# Patient Record
Sex: MALE | Race: WHITE | NOT HISPANIC OR LATINO
[De-identification: names, ages, dates, MRNs, and addresses within clinical notes are randomized per-mention and may not be internally consistent; named-entity substitution may affect disease eponyms.]

---

## 2017-03-30 PROBLEM — Z00.00 ENCOUNTER FOR PREVENTIVE HEALTH EXAMINATION: Status: ACTIVE | Noted: 2017-03-30

## 2018-06-27 ENCOUNTER — RX RENEWAL (OUTPATIENT)
Age: 83
End: 2018-06-27

## 2018-06-28 ENCOUNTER — RX RENEWAL (OUTPATIENT)
Age: 83
End: 2018-06-28

## 2018-06-29 ENCOUNTER — APPOINTMENT (OUTPATIENT)
Dept: INTERNAL MEDICINE | Facility: CLINIC | Age: 83
End: 2018-06-29

## 2018-06-29 ENCOUNTER — APPOINTMENT (OUTPATIENT)
Dept: CARDIOLOGY | Facility: CLINIC | Age: 83
End: 2018-06-29

## 2018-06-29 VITALS
DIASTOLIC BLOOD PRESSURE: 74 MMHG | HEART RATE: 74 BPM | TEMPERATURE: 97.7 F | WEIGHT: 132 LBS | BODY MASS INDEX: 20.96 KG/M2 | HEIGHT: 66.5 IN | OXYGEN SATURATION: 98 % | SYSTOLIC BLOOD PRESSURE: 161 MMHG

## 2018-06-29 VITALS
DIASTOLIC BLOOD PRESSURE: 80 MMHG | OXYGEN SATURATION: 97 % | HEART RATE: 71 BPM | TEMPERATURE: 97.9 F | SYSTOLIC BLOOD PRESSURE: 162 MMHG

## 2018-06-29 DIAGNOSIS — Z86.39 PERSONAL HISTORY OF OTHER ENDOCRINE, NUTRITIONAL AND METABOLIC DISEASE: ICD-10-CM

## 2018-06-29 DIAGNOSIS — Z86.69 PERSONAL HISTORY OF OTHER DISEASES OF THE NERVOUS SYSTEM AND SENSE ORGANS: ICD-10-CM

## 2018-06-29 DIAGNOSIS — Z87.891 PERSONAL HISTORY OF NICOTINE DEPENDENCE: ICD-10-CM

## 2018-06-29 DIAGNOSIS — Z85.46 PERSONAL HISTORY OF MALIGNANT NEOPLASM OF PROSTATE: ICD-10-CM

## 2018-06-29 DIAGNOSIS — Z86.79 PERSONAL HISTORY OF OTHER DISEASES OF THE CIRCULATORY SYSTEM: ICD-10-CM

## 2018-06-29 RX ORDER — CHROMIUM 200 MCG
1000 TABLET ORAL
Refills: 0 | Status: ACTIVE | COMMUNITY

## 2018-06-29 RX ORDER — PSYLLIUM HUSK 0.4 G
CAPSULE ORAL
Refills: 0 | Status: ACTIVE | COMMUNITY

## 2018-06-29 RX ORDER — ASPIRIN 81 MG
81 TABLET, DELAYED RELEASE (ENTERIC COATED) ORAL
Refills: 0 | Status: ACTIVE | COMMUNITY

## 2018-06-29 RX ORDER — UBIDECARENONE/VIT E ACET 100MG-5
CAPSULE ORAL
Refills: 0 | Status: ACTIVE | COMMUNITY

## 2018-06-29 RX ORDER — MULTIVITAMIN
TABLET ORAL
Refills: 0 | Status: ACTIVE | COMMUNITY

## 2018-06-29 NOTE — ASSESSMENT
[FreeTextEntry1] : pt doing well after pelvic fracture. Will check routine labs andsee pt in 3 mos.

## 2018-06-29 NOTE — HISTORY OF PRESENT ILLNESS
[FreeTextEntry1] : follow up recent hospitalization for pelvic fx [de-identified] : pt with a pelvic fx in march. got out of rehab x 1 mos. doing well without pain, sob or palpitations. wt is  down to 130. meds reviewed. pt off amlodipine and tramadol. on toprol.

## 2018-07-02 LAB
ALBUMIN SERPL ELPH-MCNC: 4.1 G/DL
ALP BLD-CCNC: 85 U/L
ALT SERPL-CCNC: 10 U/L
ANION GAP SERPL CALC-SCNC: 14 MMOL/L
AST SERPL-CCNC: 16 U/L
BILIRUB SERPL-MCNC: 0.3 MG/DL
BUN SERPL-MCNC: 19 MG/DL
CALCIUM SERPL-MCNC: 9.6 MG/DL
CHLORIDE SERPL-SCNC: 103 MMOL/L
CHOLEST SERPL-MCNC: 159 MG/DL
CHOLEST/HDLC SERPL: 2.7 RATIO
CO2 SERPL-SCNC: 24 MMOL/L
CREAT SERPL-MCNC: 0.92 MG/DL
GLUCOSE SERPL-MCNC: 90 MG/DL
HDLC SERPL-MCNC: 60 MG/DL
LDLC SERPL CALC-MCNC: 79 MG/DL
POTASSIUM SERPL-SCNC: 5.1 MMOL/L
PROT SERPL-MCNC: 7.2 G/DL
SODIUM SERPL-SCNC: 141 MMOL/L
TRIGL SERPL-MCNC: 101 MG/DL

## 2018-07-03 ENCOUNTER — MEDICATION RENEWAL (OUTPATIENT)
Age: 83
End: 2018-07-03

## 2018-07-05 LAB
BASOPHILS # BLD AUTO: 0.03 K/UL
BASOPHILS NFR BLD AUTO: 0.5 %
EOSINOPHIL # BLD AUTO: 0.54 K/UL
EOSINOPHIL NFR BLD AUTO: 8.8 %
HCT VFR BLD CALC: 37.1 %
HGB BLD-MCNC: 11.7 G/DL
IMM GRANULOCYTES NFR BLD AUTO: 1.1 %
LYMPHOCYTES # BLD AUTO: 0.68 K/UL
LYMPHOCYTES NFR BLD AUTO: 11 %
MAN DIFF?: NORMAL
MCHC RBC-ENTMCNC: 28.5 PG
MCHC RBC-ENTMCNC: 31.5 GM/DL
MCV RBC AUTO: 90.5 FL
MONOCYTES # BLD AUTO: 0.58 K/UL
MONOCYTES NFR BLD AUTO: 9.4 %
NEUTROPHILS # BLD AUTO: 4.26 K/UL
NEUTROPHILS NFR BLD AUTO: 69.2 %
PLATELET # BLD AUTO: 318 K/UL
RBC # BLD: 4.1 M/UL
RBC # FLD: 16 %
WBC # FLD AUTO: 6.16 K/UL

## 2018-07-22 ENCOUNTER — NON-APPOINTMENT (OUTPATIENT)
Age: 83
End: 2018-07-22

## 2018-07-22 PROBLEM — Z86.79 HISTORY OF HEART VALVE ABNORMALITY: Status: RESOLVED | Noted: 2018-07-22 | Resolved: 2018-07-22

## 2018-07-22 PROBLEM — Z86.79 HISTORY OF HYPERTENSION: Status: RESOLVED | Noted: 2018-07-22 | Resolved: 2018-07-22

## 2018-07-22 PROBLEM — Z86.39 HISTORY OF HYPERLIPIDEMIA: Status: RESOLVED | Noted: 2018-07-22 | Resolved: 2018-07-22

## 2018-07-22 PROBLEM — Z86.69 HISTORY OF GLAUCOMA: Status: RESOLVED | Noted: 2018-07-22 | Resolved: 2018-07-22

## 2018-07-22 PROBLEM — Z87.891 FORMER SMOKER: Status: ACTIVE | Noted: 2018-07-22

## 2018-10-08 ENCOUNTER — APPOINTMENT (OUTPATIENT)
Dept: INTERNAL MEDICINE | Facility: CLINIC | Age: 83
End: 2018-10-08

## 2018-10-08 ENCOUNTER — APPOINTMENT (OUTPATIENT)
Dept: CARDIOLOGY | Facility: CLINIC | Age: 83
End: 2018-10-08

## 2018-10-08 ENCOUNTER — NON-APPOINTMENT (OUTPATIENT)
Age: 83
End: 2018-10-08

## 2018-10-08 VITALS
BODY MASS INDEX: 20.92 KG/M2 | HEART RATE: 59 BPM | DIASTOLIC BLOOD PRESSURE: 91 MMHG | WEIGHT: 138 LBS | OXYGEN SATURATION: 100 % | TEMPERATURE: 97.7 F | SYSTOLIC BLOOD PRESSURE: 181 MMHG | HEIGHT: 68 IN

## 2018-10-08 DIAGNOSIS — Z80.3 FAMILY HISTORY OF MALIGNANT NEOPLASM OF BREAST: ICD-10-CM

## 2018-10-08 DIAGNOSIS — Z80.0 FAMILY HISTORY OF MALIGNANT NEOPLASM OF DIGESTIVE ORGANS: ICD-10-CM

## 2018-10-08 DIAGNOSIS — I48.0 PAROXYSMAL ATRIAL FIBRILLATION: ICD-10-CM

## 2018-10-08 DIAGNOSIS — Z87.81 PERSONAL HISTORY OF (HEALED) TRAUMATIC FRACTURE: ICD-10-CM

## 2018-10-08 NOTE — ASSESSMENT
[FreeTextEntry1] : Patient is doing quite well. He may reduce his iron pill to one daily. He is to return in 3 months for labs.

## 2018-10-08 NOTE — HISTORY OF PRESENT ILLNESS
[FreeTextEntry1] : Routine followup patient with history of rectal carcinoma and ASHD. [de-identified] : Patient has been doing quite well. He remains active and able to drive. He is taking iron supplements twice daily. Hemoglobin is up to 12.4. He denies bowel problems. Denies chest pain or shortness of breath. He denies pelvic or leg pains after his pelvic fracture a few months ago. He has recovered beautifully.

## 2018-10-10 PROBLEM — Z87.81 HISTORY OF FRACTURE OF PELVIS: Status: RESOLVED | Noted: 2018-10-10 | Resolved: 2018-10-10

## 2018-10-18 PROBLEM — Z80.0 FAMILY HISTORY OF THROAT CANCER: Status: ACTIVE | Noted: 2018-10-18

## 2018-10-18 PROBLEM — I48.0 PAROXYSMAL ATRIAL FIBRILLATION: Status: RESOLVED | Noted: 2018-06-29 | Resolved: 2018-10-18

## 2018-10-18 PROBLEM — Z80.0 FAMILY HISTORY OF LIVER CANCER: Status: ACTIVE | Noted: 2018-10-18

## 2018-10-18 PROBLEM — Z80.3 FAMILY HISTORY OF MALIGNANT NEOPLASM OF BREAST: Status: ACTIVE | Noted: 2018-10-18

## 2018-10-31 ENCOUNTER — APPOINTMENT (OUTPATIENT)
Dept: INTERNAL MEDICINE | Facility: CLINIC | Age: 83
End: 2018-10-31

## 2018-10-31 VITALS
BODY MASS INDEX: 20.46 KG/M2 | HEIGHT: 68 IN | OXYGEN SATURATION: 97 % | SYSTOLIC BLOOD PRESSURE: 184 MMHG | DIASTOLIC BLOOD PRESSURE: 77 MMHG | WEIGHT: 135 LBS | HEART RATE: 76 BPM

## 2018-10-31 NOTE — PHYSICAL EXAM

## 2018-10-31 NOTE — ASSESSMENT
[FreeTextEntry1] : I suspect the cough is due a viral upper respiratory tract infection. I doubt we are dealing with pneumonia. I doubt we are dealing with an aspiration process. Patient is to observe the cough can call me next week.

## 2018-10-31 NOTE — HISTORY OF PRESENT ILLNESS
[FreeTextEntry1] : Evaluation for cough. [de-identified] : Patient with a 2-3 day history of cough mainly dry. Occasionally productive of white sputum. No fever chills or shortness of breath. He admits to rhinorrhea. There is a history of a prior swallowing problem after his stroke. He's been on thin liquids for a few months and has not had any problems. It's unclear if the cough is related to thin liquids for now. There is no sick contacts. Patient has received his flu vaccine.

## 2018-12-10 ENCOUNTER — APPOINTMENT (OUTPATIENT)
Dept: INTERNAL MEDICINE | Facility: CLINIC | Age: 83
End: 2018-12-10
Payer: MEDICARE

## 2018-12-10 ENCOUNTER — RESULT REVIEW (OUTPATIENT)
Age: 83
End: 2018-12-10

## 2018-12-10 VITALS
DIASTOLIC BLOOD PRESSURE: 80 MMHG | WEIGHT: 135 LBS | HEIGHT: 68 IN | SYSTOLIC BLOOD PRESSURE: 140 MMHG | TEMPERATURE: 97.6 F | HEART RATE: 72 BPM | OXYGEN SATURATION: 97 % | BODY MASS INDEX: 20.46 KG/M2

## 2018-12-10 DIAGNOSIS — J06.9 ACUTE UPPER RESPIRATORY INFECTION, UNSPECIFIED: ICD-10-CM

## 2018-12-10 PROCEDURE — 99214 OFFICE O/P EST MOD 30 MIN: CPT | Mod: 25

## 2018-12-10 PROCEDURE — 36415 COLL VENOUS BLD VENIPUNCTURE: CPT

## 2018-12-10 NOTE — ASSESSMENT
[FreeTextEntry1] : I suspect the cough is due a viral upper respiratory tract infection. However in view of the recurrent nature of the cough we'll get a chest x-ray. We'll also check labs in view of the pale conjunctiva on exam.

## 2018-12-10 NOTE — HISTORY OF PRESENT ILLNESS
[FreeTextEntry1] : Dry cough x2 weeks. [de-identified] : Patient with a dry cough x2 weeks. He denies fever chills or rhinorrhea. Of note the patient had a cough the end of October as well. This seemed to resolve but now has come back over the past 2 weeks. Patient denies swallowing difficulty. The family is concerned because of the recurrent cough. He generally feels well otherwise.

## 2018-12-10 NOTE — PHYSICAL EXAM
[No Acute Distress] : no acute distress [Well Nourished] : well nourished [Well Developed] : well developed [Well-Appearing] : well-appearing [PERRL] : pupils equal round and reactive to light [EOMI] : extraocular movements intact [Normal Outer Ear/Nose] : the outer ears and nose were normal in appearance [Normal Oropharynx] : the oropharynx was normal [No JVD] : no jugular venous distention [Supple] : supple [No Lymphadenopathy] : no lymphadenopathy [Thyroid Normal, No Nodules] : the thyroid was normal and there were no nodules present [No Respiratory Distress] : no respiratory distress  [Clear to Auscultation] : lungs were clear to auscultation bilaterally [No Accessory Muscle Use] : no accessory muscle use [Normal Rate] : normal rate  [Regular Rhythm] : with a regular rhythm [Normal S1, S2] : normal S1 and S2 [No Murmur] : no murmur heard [No Carotid Bruits] : no carotid bruits [No Abdominal Bruit] : a ~M bruit was not heard ~T in the abdomen [No Varicosities] : no varicosities [Pedal Pulses Present] : the pedal pulses are present [No Edema] : there was no peripheral edema [No Extremity Clubbing/Cyanosis] : no extremity clubbing/cyanosis [No Palpable Aorta] : no palpable aorta [Soft] : abdomen soft [Non Tender] : non-tender [Non-distended] : non-distended [No Masses] : no abdominal mass palpated [No HSM] : no HSM [Normal Bowel Sounds] : normal bowel sounds [Normal Posterior Cervical Nodes] : no posterior cervical lymphadenopathy [Normal Anterior Cervical Nodes] : no anterior cervical lymphadenopathy [No CVA Tenderness] : no CVA  tenderness [No Spinal Tenderness] : no spinal tenderness [No Joint Swelling] : no joint swelling [Grossly Normal Strength/Tone] : grossly normal strength/tone [No Rash] : no rash [Normal Gait] : normal gait [Coordination Grossly Intact] : coordination grossly intact [No Focal Deficits] : no focal deficits [Normal Affect] : the affect was normal [Normal Insight/Judgement] : insight and judgment were intact [de-identified] : pale conjunctiva

## 2018-12-11 LAB
ALBUMIN SERPL ELPH-MCNC: 4.2 G/DL
ALP BLD-CCNC: 73 U/L
ALT SERPL-CCNC: 11 U/L
ANION GAP SERPL CALC-SCNC: 10 MMOL/L
AST SERPL-CCNC: 16 U/L
BASOPHILS # BLD AUTO: 0.03 K/UL
BASOPHILS NFR BLD AUTO: 0.5 %
BILIRUB SERPL-MCNC: 0.2 MG/DL
BUN SERPL-MCNC: 22 MG/DL
CALCIUM SERPL-MCNC: 9.3 MG/DL
CHLORIDE SERPL-SCNC: 104 MMOL/L
CO2 SERPL-SCNC: 27 MMOL/L
CREAT SERPL-MCNC: 1 MG/DL
EOSINOPHIL # BLD AUTO: 0.61 K/UL
EOSINOPHIL NFR BLD AUTO: 10 %
GLUCOSE SERPL-MCNC: 96 MG/DL
HCT VFR BLD CALC: 39 %
HGB BLD-MCNC: 12.3 G/DL
IMM GRANULOCYTES NFR BLD AUTO: 1.3 %
LYMPHOCYTES # BLD AUTO: 0.5 K/UL
LYMPHOCYTES NFR BLD AUTO: 8.2 %
MAN DIFF?: NORMAL
MCHC RBC-ENTMCNC: 29.4 PG
MCHC RBC-ENTMCNC: 31.5 GM/DL
MCV RBC AUTO: 93.1 FL
MONOCYTES # BLD AUTO: 0.64 K/UL
MONOCYTES NFR BLD AUTO: 10.5 %
NEUTROPHILS # BLD AUTO: 4.25 K/UL
NEUTROPHILS NFR BLD AUTO: 69.5 %
PLATELET # BLD AUTO: 356 K/UL
POTASSIUM SERPL-SCNC: 4.9 MMOL/L
PROT SERPL-MCNC: 6.8 G/DL
RBC # BLD: 4.19 M/UL
RBC # FLD: 13.6 %
SODIUM SERPL-SCNC: 141 MMOL/L
WBC # FLD AUTO: 6.11 K/UL

## 2019-01-02 ENCOUNTER — RECORD ABSTRACTING (OUTPATIENT)
Age: 84
End: 2019-01-02

## 2019-01-02 DIAGNOSIS — Z78.9 OTHER SPECIFIED HEALTH STATUS: ICD-10-CM

## 2019-01-02 DIAGNOSIS — S22.39XA FRACTURE OF ONE RIB, UNSPECIFIED SIDE, INITIAL ENCOUNTER FOR CLOSED FRACTURE: ICD-10-CM

## 2019-01-02 RX ORDER — TIMOLOL MALEATE 5 MG/ML
0.5 SOLUTION OPHTHALMIC
Refills: 0 | Status: ACTIVE | COMMUNITY

## 2019-03-01 ENCOUNTER — APPOINTMENT (OUTPATIENT)
Dept: INTERNAL MEDICINE | Facility: CLINIC | Age: 84
End: 2019-03-01
Payer: MEDICARE

## 2019-03-01 VITALS
DIASTOLIC BLOOD PRESSURE: 70 MMHG | HEART RATE: 71 BPM | OXYGEN SATURATION: 99 % | BODY MASS INDEX: 20.53 KG/M2 | TEMPERATURE: 98 F | SYSTOLIC BLOOD PRESSURE: 130 MMHG | WEIGHT: 135 LBS

## 2019-03-01 PROCEDURE — 99213 OFFICE O/P EST LOW 20 MIN: CPT

## 2019-03-01 NOTE — HISTORY OF PRESENT ILLNESS
[FreeTextEntry1] : Patient for followup of hypertension, and anemia. [de-identified] : Patient is doing quite well over the past 3 months. His appetite is good. His weight is stable at about 140. His bowels are normal. He is going to his oncologist next week. He is on one iron tablet a day. He will be followed by Dr. lopez next month. He denies cough or shortness of breath. He again overall is doing quite well without chronic complaints.

## 2019-03-01 NOTE — ASSESSMENT
[FreeTextEntry1] : Patient with history of hypertension and history of CVA with complete resolution. Patient's blood pressure is under good control at 130/80. He is to continue current medications. He will be getting a blood test next week by his oncologist. Advance him to send me a copy. Patient will follow up with me in 3 months

## 2019-04-05 ENCOUNTER — APPOINTMENT (OUTPATIENT)
Dept: CARDIOLOGY | Facility: CLINIC | Age: 84
End: 2019-04-05
Payer: MEDICARE

## 2019-04-05 VITALS
DIASTOLIC BLOOD PRESSURE: 80 MMHG | WEIGHT: 140 LBS | SYSTOLIC BLOOD PRESSURE: 150 MMHG | HEART RATE: 67 BPM | OXYGEN SATURATION: 97 % | BODY MASS INDEX: 21.29 KG/M2

## 2019-04-05 PROCEDURE — 93000 ELECTROCARDIOGRAM COMPLETE: CPT

## 2019-04-05 PROCEDURE — 99214 OFFICE O/P EST MOD 30 MIN: CPT

## 2019-04-06 ENCOUNTER — NON-APPOINTMENT (OUTPATIENT)
Age: 84
End: 2019-04-06

## 2019-04-06 NOTE — PHYSICAL EXAM
[Normal Conjunctiva] : the conjunctiva exhibited no abnormalities [Auscultation Breath Sounds / Voice Sounds] : lungs were clear to auscultation bilaterally [Heart Rate And Rhythm] : heart rate and rhythm were normal [Heart Sounds] : normal S1 and S2 [Murmurs] : no murmurs present [Edema] : no peripheral edema present [Bowel Sounds] : normal bowel sounds [Abdomen Soft] : soft [Abnormal Walk] : normal gait [Cyanosis, Localized] : no localized cyanosis [] : no rash [Affect] : the affect was normal [FreeTextEntry1] : pleasant

## 2019-04-06 NOTE — HISTORY OF PRESENT ILLNESS
[FreeTextEntry1] : 88-year-old man\par Routine followup\par "I feel okay "Mr. Mckenzie denies chest pain, palpitations, shortness of breath or syncope.\par His main complaint is that "I'm getting older. "\par Dawit is accompanied today by his wife and son.

## 2019-04-06 NOTE — REVIEW OF SYSTEMS
[Feeling Fatigued] : feeling fatigued [Eyeglasses] : currently wearing eyeglasses [see HPI] : see HPI [Negative] : Psychiatric

## 2019-04-06 NOTE — DISCUSSION/SUMMARY
[FreeTextEntry1] : Atrial fibrillation\par The working diagnosis is paroxysmal atrial fibrillation secondary to atherosclerotic -hypertensive heart disease. In 4/18  atrial fibrillation with a ventricular response 80/minute was detected in the setting of a hospitalization for altered mental status and diarrhea. Sinus rhythm was restored spontaneously . A 9/18 Holter monitor was normal. In my judgment, the risk of anticoagulation/antithrombotic therapy at this time outweigh any potential benefit.\par \par I have recommended the following:\par 1. Continue the present medical regimen\par 2. No further cardiac testing for this problem at this time\par 3. Reconsideration of anticoagulation if recurrent atrial fibrillation is documented\par \par \par \par Hyperlipidemia\par Hyperlipidemia represents a risk factor for progressive atherosclerotic disease. The target LDL level with known efforts chronic heart disease is about 70. HMG-CoA reductase inhibitor therapy has been effective. In 6/18 the serum cholesterol level was 159 triglycerides 101 HDL 60 and LDL 79. The dose of atorvastatin may be increased if required to maintain optimal levels. Non-pharmacological therapy, specifically diet and exercise are emphasized as major aspects of treatment.\par \par I have recommended the following:\par 1 low-salt low-fat diet. Regular aerobic exercise\par 2 continue present medical regimen\par 3 target LDL level to about 70 as discussed above\par 4 increase atorvastatin dose of required to maintain optimal levels as noted above.\par \par \par \par Atherosclerotic heart disease\par Atherosclerotic heart disease is stable . 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 The RCA was 100% occluded. The LAD had a 20% stenosis. The left circumflex was patent. The left ventricle ejection fraction was 52%. Since that time has been no recurrent myocardial ischemic event.. The resting 4/19  electrocardiogram shows no evidence of myocardial ischemia or infarction. A 4/18 echocardiogram demonstrated left ventricular ejection fraction of 64%.. In view of the lack of symptoms and clinical course continued medical management is indicated. The patient's age and multiple medical problems are major influences on management decisions.\par \par I have recommended the following:\par 1. Risk factor modification\par 2. Continue the present medical regimen\par 3. No further cardiac testing for this problem at this time\par 4.Lexiscan sestamibi study prior to next office evaluation in 6 months\par \par \par Hypertension\par Hypertension has reportedly been controlled on the present medical regimen. Elevation of the blood pressure on initial examination today( 150 /80  ) may in part be related to a "white coat effect." Repeat blood pressure checks will be helpful to determine requirements for additional antihypertensive medical therapy. In the setting of atherosclerotic heart disease previous myocardial infarction and atrial fibrillation beta blocker and Ace-I./ARB therapy are attractive.\par \par I have recommended the following:\par 1. Continue the present medical regimen\par 2. Home blood pressure monitoring\par 3. Increased metoprolol dose and/or addition of ACE-I/ARB therapy if required to maintain optimum levels as discussed above\par \par \par Valvular heart disease\par The 4/18 echocardiogram revealed mild mitral regurgitation with calcification the mitral valve annulus. Aortic valve sclerosis was noted. The left ventricle ejection fraction was 64% . The present cardiac physical examination is not suggestive of severe mitral regurgitation. In view of the lack of symptoms, absence of heart failure and clinical course continued monitoring without intervention is indicated at this time .\par \par I have recommended the following :\par 1. no further cardiac testing for this problem at this time.

## 2019-06-07 ENCOUNTER — APPOINTMENT (OUTPATIENT)
Dept: INTERNAL MEDICINE | Facility: CLINIC | Age: 84
End: 2019-06-07
Payer: MEDICARE

## 2019-06-07 VITALS
BODY MASS INDEX: 21.52 KG/M2 | HEIGHT: 68 IN | SYSTOLIC BLOOD PRESSURE: 140 MMHG | HEART RATE: 95 BPM | WEIGHT: 142 LBS | OXYGEN SATURATION: 100 % | DIASTOLIC BLOOD PRESSURE: 80 MMHG

## 2019-06-07 PROCEDURE — 36415 COLL VENOUS BLD VENIPUNCTURE: CPT

## 2019-06-07 PROCEDURE — 99214 OFFICE O/P EST MOD 30 MIN: CPT | Mod: 25

## 2019-06-07 NOTE — ASSESSMENT
[FreeTextEntry1] : Patient is clinically doing quite well. We'll check routine labs including CBC. Patient is to continue with current medication and return in 4 months.

## 2019-06-07 NOTE — HISTORY OF PRESENT ILLNESS
[FreeTextEntry1] : Routine three-month followup [de-identified] : Patient is an 88-year-old male with a history of ASHD, prior CVA and history of rectal cancer in remission. He's been feeling well over the past 3 months. His appetite is excellent. He has gained 2 pounds. He has no bowel problems. He is on iron pills once per day. Last lab work 3 months ago. He has no chest pain or shortness of breath. He saw Dr farrell but recently. He is scheduled for a stress test in the fall.

## 2019-06-07 NOTE — PHYSICAL EXAM
[No Acute Distress] : no acute distress [Well Developed] : well developed [Well Nourished] : well nourished [Normal Sclera/Conjunctiva] : normal sclera/conjunctiva [Well-Appearing] : well-appearing [PERRL] : pupils equal round and reactive to light [EOMI] : extraocular movements intact [Normal Outer Ear/Nose] : the outer ears and nose were normal in appearance [Normal Oropharynx] : the oropharynx was normal [No JVD] : no jugular venous distention [Supple] : supple [No Lymphadenopathy] : no lymphadenopathy [Thyroid Normal, No Nodules] : the thyroid was normal and there were no nodules present [No Respiratory Distress] : no respiratory distress  [Clear to Auscultation] : lungs were clear to auscultation bilaterally [No Accessory Muscle Use] : no accessory muscle use [Normal Rate] : normal rate  [Regular Rhythm] : with a regular rhythm [Normal S1, S2] : normal S1 and S2 [No Carotid Bruits] : no carotid bruits [No Murmur] : no murmur heard [No Abdominal Bruit] : a ~M bruit was not heard ~T in the abdomen [No Varicosities] : no varicosities [Pedal Pulses Present] : the pedal pulses are present [No Extremity Clubbing/Cyanosis] : no extremity clubbing/cyanosis [No Edema] : there was no peripheral edema [No Palpable Aorta] : no palpable aorta [Soft] : abdomen soft [Non Tender] : non-tender [Non-distended] : non-distended [No Masses] : no abdominal mass palpated [No HSM] : no HSM [Normal Posterior Cervical Nodes] : no posterior cervical lymphadenopathy [Normal Bowel Sounds] : normal bowel sounds [Normal Anterior Cervical Nodes] : no anterior cervical lymphadenopathy [No Joint Swelling] : no joint swelling [No Spinal Tenderness] : no spinal tenderness [No CVA Tenderness] : no CVA  tenderness [Grossly Normal Strength/Tone] : grossly normal strength/tone [No Rash] : no rash [Coordination Grossly Intact] : coordination grossly intact [Normal Gait] : normal gait [No Focal Deficits] : no focal deficits [Normal Insight/Judgement] : insight and judgment were intact [Normal Affect] : the affect was normal

## 2019-06-10 LAB
ALBUMIN SERPL ELPH-MCNC: 4.4 G/DL
ALP BLD-CCNC: 65 U/L
ALT SERPL-CCNC: 10 U/L
ANION GAP SERPL CALC-SCNC: 10 MMOL/L
AST SERPL-CCNC: 17 U/L
BASOPHILS # BLD AUTO: 0.07 K/UL
BASOPHILS NFR BLD AUTO: 0.9 %
BILIRUB SERPL-MCNC: 0.4 MG/DL
BUN SERPL-MCNC: 18 MG/DL
CALCIUM SERPL-MCNC: 10.2 MG/DL
CHLORIDE SERPL-SCNC: 103 MMOL/L
CHOLEST SERPL-MCNC: 156 MG/DL
CHOLEST/HDLC SERPL: 2.7 RATIO
CO2 SERPL-SCNC: 26 MMOL/L
CREAT SERPL-MCNC: 1.16 MG/DL
EOSINOPHIL # BLD AUTO: 0.87 K/UL
EOSINOPHIL NFR BLD AUTO: 11.7 %
GLUCOSE SERPL-MCNC: 96 MG/DL
HCT VFR BLD CALC: 42.6 %
HDLC SERPL-MCNC: 57 MG/DL
HGB BLD-MCNC: 12.8 G/DL
IMM GRANULOCYTES NFR BLD AUTO: 1.3 %
LDLC SERPL CALC-MCNC: 79 MG/DL
LYMPHOCYTES # BLD AUTO: 0.57 K/UL
LYMPHOCYTES NFR BLD AUTO: 7.6 %
MAN DIFF?: NORMAL
MCHC RBC-ENTMCNC: 30 GM/DL
MCHC RBC-ENTMCNC: 30.5 PG
MCV RBC AUTO: 101.4 FL
MONOCYTES # BLD AUTO: 0.83 K/UL
MONOCYTES NFR BLD AUTO: 11.1 %
NEUTROPHILS # BLD AUTO: 5.02 K/UL
NEUTROPHILS NFR BLD AUTO: 67.4 %
PLATELET # BLD AUTO: 312 K/UL
POTASSIUM SERPL-SCNC: 5.1 MMOL/L
PROT SERPL-MCNC: 7.2 G/DL
RBC # BLD: 4.2 M/UL
RBC # FLD: 13.5 %
SODIUM SERPL-SCNC: 139 MMOL/L
TRIGL SERPL-MCNC: 99 MG/DL
WBC # FLD AUTO: 7.46 K/UL

## 2019-07-26 ENCOUNTER — RX RENEWAL (OUTPATIENT)
Age: 84
End: 2019-07-26

## 2019-07-26 ENCOUNTER — MEDICATION RENEWAL (OUTPATIENT)
Age: 84
End: 2019-07-26

## 2019-10-04 ENCOUNTER — APPOINTMENT (OUTPATIENT)
Dept: INTERNAL MEDICINE | Facility: CLINIC | Age: 84
End: 2019-10-04
Payer: MEDICARE

## 2019-10-04 ENCOUNTER — APPOINTMENT (OUTPATIENT)
Dept: CARDIOLOGY | Facility: CLINIC | Age: 84
End: 2019-10-04
Payer: MEDICARE

## 2019-10-04 ENCOUNTER — NON-APPOINTMENT (OUTPATIENT)
Age: 84
End: 2019-10-04

## 2019-10-04 VITALS
BODY MASS INDEX: 22.83 KG/M2 | DIASTOLIC BLOOD PRESSURE: 70 MMHG | HEART RATE: 76 BPM | SYSTOLIC BLOOD PRESSURE: 130 MMHG | WEIGHT: 150.13 LBS | OXYGEN SATURATION: 98 %

## 2019-10-04 VITALS
HEIGHT: 68 IN | HEART RATE: 78 BPM | SYSTOLIC BLOOD PRESSURE: 140 MMHG | DIASTOLIC BLOOD PRESSURE: 70 MMHG | OXYGEN SATURATION: 96 % | WEIGHT: 150 LBS | BODY MASS INDEX: 22.73 KG/M2

## 2019-10-04 DIAGNOSIS — Z85.048 PERSONAL HISTORY OF OTHER MALIGNANT NEOPLASM OF RECTUM, RECTOSIGMOID JUNCTION, AND ANUS: ICD-10-CM

## 2019-10-04 DIAGNOSIS — I25.10 ATHEROSCLEROTIC HEART DISEASE OF NATIVE CORONARY ARTERY W/OUT ANGINA PECTORIS: ICD-10-CM

## 2019-10-04 PROCEDURE — 93000 ELECTROCARDIOGRAM COMPLETE: CPT

## 2019-10-04 PROCEDURE — 99214 OFFICE O/P EST MOD 30 MIN: CPT

## 2019-10-04 PROCEDURE — 99215 OFFICE O/P EST HI 40 MIN: CPT

## 2019-10-04 NOTE — PHYSICAL EXAM
[No Acute Distress] : no acute distress [Well Nourished] : well nourished [Well Developed] : well developed [Well-Appearing] : well-appearing [Normal Sclera/Conjunctiva] : normal sclera/conjunctiva [PERRL] : pupils equal round and reactive to light [EOMI] : extraocular movements intact [Normal Outer Ear/Nose] : the outer ears and nose were normal in appearance [Normal Oropharynx] : the oropharynx was normal [No JVD] : no jugular venous distention [No Lymphadenopathy] : no lymphadenopathy [Supple] : supple [Thyroid Normal, No Nodules] : the thyroid was normal and there were no nodules present [No Respiratory Distress] : no respiratory distress  [No Accessory Muscle Use] : no accessory muscle use [Clear to Auscultation] : lungs were clear to auscultation bilaterally [Normal Rate] : normal rate  [Regular Rhythm] : with a regular rhythm [Normal S1, S2] : normal S1 and S2 [No Carotid Bruits] : no carotid bruits [No Murmur] : no murmur heard [No Abdominal Bruit] : a ~M bruit was not heard ~T in the abdomen [No Varicosities] : no varicosities [Pedal Pulses Present] : the pedal pulses are present [No Palpable Aorta] : no palpable aorta [No Edema] : there was no peripheral edema [No Extremity Clubbing/Cyanosis] : no extremity clubbing/cyanosis [Soft] : abdomen soft [Non Tender] : non-tender [Non-distended] : non-distended [No Masses] : no abdominal mass palpated [No HSM] : no HSM [Normal Bowel Sounds] : normal bowel sounds [Normal Posterior Cervical Nodes] : no posterior cervical lymphadenopathy [Normal Anterior Cervical Nodes] : no anterior cervical lymphadenopathy [No CVA Tenderness] : no CVA  tenderness [No Spinal Tenderness] : no spinal tenderness [No Joint Swelling] : no joint swelling [Grossly Normal Strength/Tone] : grossly normal strength/tone [No Rash] : no rash [Coordination Grossly Intact] : coordination grossly intact [No Focal Deficits] : no focal deficits [Normal Gait] : normal gait [Normal Affect] : the affect was normal [Normal Insight/Judgement] : insight and judgment were intact

## 2019-10-04 NOTE — ASSESSMENT
[FreeTextEntry1] : Patient is doing quite well. There is no evidence of recurrence of rectal cancer. His anemia is stable. His blood pressure is borderline at 140/80. He has no recurrence of chest pain, shortness of breath or palpitations. Patient is to continue current therapy and return to see me in 6 months

## 2019-10-04 NOTE — HISTORY OF PRESENT ILLNESS
[FreeTextEntry1] : Routine four-month followup [de-identified] : Patient recently had followup at [facility] for rectal cancer. He had an MRI and a CAT scan which was unremarkable. He is felt to have no evidence of active disease. Patient denies chest pain, shortness of breath or palpitations. He was recently seen by Dr. farrell. His blood pressure usually ranges 140/70. He denies chronic complaints. He does have limited motion of the right shoulder which she says he's had for quite a long time.

## 2019-10-05 PROBLEM — I25.10 CORONARY ATHEROSCLEROSIS OF NATIVE CORONARY ARTERY: Status: RESOLVED | Noted: 2018-06-29 | Resolved: 2019-10-05

## 2019-10-05 PROBLEM — Z85.048 HISTORY OF RECTAL CARCINOMA: Status: RESOLVED | Noted: 2018-06-29 | Resolved: 2019-10-05

## 2019-10-05 NOTE — PHYSICAL EXAM
[Normal Conjunctiva] : the conjunctiva exhibited no abnormalities [Heart Rate And Rhythm] : heart rate and rhythm were normal [Auscultation Breath Sounds / Voice Sounds] : lungs were clear to auscultation bilaterally [Murmurs] : no murmurs present [Heart Sounds] : normal S1 and S2 [Edema] : no peripheral edema present [Abdomen Soft] : soft [Bowel Sounds] : normal bowel sounds [Abnormal Walk] : normal gait [Cyanosis, Localized] : no localized cyanosis [] : no rash [Affect] : the affect was normal [Abdomen Tenderness] : non-tender [FreeTextEntry1] : pleasant

## 2019-10-05 NOTE — HISTORY OF PRESENT ILLNESS
[FreeTextEntry1] : 88-year-old male\par Routine followup\par "I feel okay." Dawit denies chest pain, shortness of breath at rest palpitations or syncope. Dilation from [facility] regarding his previous history of prostate cancer showed no recurrence.\par \par Mr. Mckenzie presents today for cardiovascular reevaluation. He is accompanied by his wife and son

## 2019-10-05 NOTE — DISCUSSION/SUMMARY
[FreeTextEntry1] : Atrial fibrillation\par The working diagnosis is paroxysmal atrial fibrillation secondary to atherosclerotic -hypertensive heart disease. In 4/18  atrial fibrillation with a ventricular response 80/minute was detected in the setting of a hospitalization for altered mental status and diarrhea. Sinus rhythm was restored spontaneously . A 9/18 Holter monitor was normal. In my judgment, the risk of anticoagulation/antithrombotic therapy at this time outweigh any potential benefit.\par \par I have recommended the following:\par 1. Continue the present medical regimen\par 2. No further cardiac testing for this problem at this time\par 3. Reconsideration of anticoagulation if recurrent atrial fibrillation is documented\par \par \par \par Hyperlipidemia\par Hyperlipidemia represents a risk factor for progressive atherosclerotic disease. The target LDL level with known atherosclerotic heart disease is about 70. HMG-CoA reductase inhibitor therapy has been effective. In 6/19 the serum cholesterol level was 156 triglycerides 99  HDL 57  and LDL 79. The dose of atorvastatin may be increased if required to maintain optimal levels. Non-pharmacological therapy, specifically diet and exercise are emphasized as major aspects of treatment.\par \par I have recommended the following:\par 1 low-salt low-fat diet. Regular aerobic exercise\par 2 continue present medical regimen\par 3 target LDL level to about 70 as discussed above\par 4 increase atorvastatin dose if required to maintain optimal levels as noted above.\par \par \par \par Atherosclerotic heart disease\par Mr. Mckenzie has known atherosclerotic heart disease. 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 The RCA was 100% occluded. The LAD had a 20% stenosis. The left circumflex was patent. The left ventricle ejection fraction was 52%. Since that time has been no recurrent myocardial ischemic event.. The resting 10/19  electrocardiogram shows no evidence of myocardial ischemia or infarction. A 4/18 echocardiogram demonstrated left ventricular ejection fraction of 64%.. In view of the lack of symptoms and clinical course continued medical management is indicated.A noninvasive cardiac reevaluation will be helpful to management decisions The patient's age and multiple medical problems are major influences on management decisions.\par \par I have recommended the following:\par 1. Risk factor modification\par 2. Continue the present medical regimen\par 3. Lexiscan sestamibi study  if / when patient consents (presently declines)\par \par \par Hypertension\par Hypertension has been controlled on the present medical regimen. In the setting of atherosclerotic heart disease previous myocardial infarction and atrial fibrillation beta blocker and Ace-I./ARB therapy are attractive.\par \par I have recommended the following:\par 1. Continue the present medical regimen\par 2. Home blood pressure monitoring\par 3. Increased metoprolol dose and/or addition of ACE-I/ARB therapy if required to maintain optimum levels as discussed above\par \par \par Valvular heart disease\par The 4/18 echocardiogram revealed mild mitral regurgitation with calcification the mitral valve annulus. Aortic valve sclerosis was noted. The left ventricle ejection fraction was 64% . The present cardiac physical examination is not suggestive of severe mitral regurgitation. In view of the lack of symptoms, absence of heart failure and clinical course continued monitoring without intervention is indicated at this time .\par \par I have recommended the following :\par 1. no further cardiac testing for this problem at this time.

## 2019-10-21 ENCOUNTER — MEDICATION RENEWAL (OUTPATIENT)
Age: 84
End: 2019-10-21

## 2020-04-03 ENCOUNTER — APPOINTMENT (OUTPATIENT)
Dept: INTERNAL MEDICINE | Facility: CLINIC | Age: 85
End: 2020-04-03

## 2020-04-03 ENCOUNTER — APPOINTMENT (OUTPATIENT)
Dept: CARDIOLOGY | Facility: CLINIC | Age: 85
End: 2020-04-03

## 2020-06-12 ENCOUNTER — RX RENEWAL (OUTPATIENT)
Age: 85
End: 2020-06-12

## 2020-09-16 ENCOUNTER — NON-APPOINTMENT (OUTPATIENT)
Age: 85
End: 2020-09-16

## 2020-09-16 ENCOUNTER — APPOINTMENT (OUTPATIENT)
Dept: CARDIOLOGY | Facility: CLINIC | Age: 85
End: 2020-09-16
Payer: MEDICARE

## 2020-09-16 ENCOUNTER — APPOINTMENT (OUTPATIENT)
Dept: INTERNAL MEDICINE | Facility: CLINIC | Age: 85
End: 2020-09-16
Payer: MEDICARE

## 2020-09-16 VITALS
HEART RATE: 78 BPM | OXYGEN SATURATION: 97 % | DIASTOLIC BLOOD PRESSURE: 70 MMHG | WEIGHT: 145 LBS | TEMPERATURE: 97.3 F | BODY MASS INDEX: 22.05 KG/M2 | SYSTOLIC BLOOD PRESSURE: 150 MMHG

## 2020-09-16 VITALS
BODY MASS INDEX: 21.98 KG/M2 | OXYGEN SATURATION: 98 % | HEIGHT: 68 IN | HEART RATE: 72 BPM | DIASTOLIC BLOOD PRESSURE: 80 MMHG | SYSTOLIC BLOOD PRESSURE: 160 MMHG | WEIGHT: 145 LBS

## 2020-09-16 DIAGNOSIS — Z23 ENCOUNTER FOR IMMUNIZATION: ICD-10-CM

## 2020-09-16 PROCEDURE — 93000 ELECTROCARDIOGRAM COMPLETE: CPT

## 2020-09-16 PROCEDURE — 36415 COLL VENOUS BLD VENIPUNCTURE: CPT

## 2020-09-16 PROCEDURE — 90662 IIV NO PRSV INCREASED AG IM: CPT

## 2020-09-16 PROCEDURE — 99214 OFFICE O/P EST MOD 30 MIN: CPT | Mod: 25

## 2020-09-16 PROCEDURE — 99214 OFFICE O/P EST MOD 30 MIN: CPT

## 2020-09-16 PROCEDURE — G0008: CPT

## 2020-09-16 NOTE — REVIEW OF SYSTEMS
[Eyeglasses] : currently wearing eyeglasses [Feeling Fatigued] : feeling fatigued [see HPI] : see HPI [Negative] : Integumentary

## 2020-09-16 NOTE — HISTORY OF PRESENT ILLNESS
[FreeTextEntry1] : Routine six-month followup [de-identified] : Patient with a history of rectal cancer followed at [facility]. He has no appointment on October 26. He feels well. Weight is stable bowels are okay. He has no shortness of breath and no chest pain. He does have a history of coronary artery disease and a prior CVA with no residual. Current blood pressure 140/70.

## 2020-09-16 NOTE — PHYSICAL EXAM
[No Acute Distress] : no acute distress [Well Nourished] : well nourished [Well Developed] : well developed [Well-Appearing] : well-appearing [Normal Sclera/Conjunctiva] : normal sclera/conjunctiva [PERRL] : pupils equal round and reactive to light [Normal Outer Ear/Nose] : the outer ears and nose were normal in appearance [EOMI] : extraocular movements intact [Normal Oropharynx] : the oropharynx was normal [No JVD] : no jugular venous distention [No Lymphadenopathy] : no lymphadenopathy [Supple] : supple [Thyroid Normal, No Nodules] : the thyroid was normal and there were no nodules present [No Respiratory Distress] : no respiratory distress  [No Accessory Muscle Use] : no accessory muscle use [Normal Rate] : normal rate  [Clear to Auscultation] : lungs were clear to auscultation bilaterally [Normal S1, S2] : normal S1 and S2 [No Murmur] : no murmur heard [Regular Rhythm] : with a regular rhythm [No Abdominal Bruit] : a ~M bruit was not heard ~T in the abdomen [No Carotid Bruits] : no carotid bruits [No Varicosities] : no varicosities [Pedal Pulses Present] : the pedal pulses are present [No Palpable Aorta] : no palpable aorta [No Edema] : there was no peripheral edema [Soft] : abdomen soft [No Extremity Clubbing/Cyanosis] : no extremity clubbing/cyanosis [Non Tender] : non-tender [No HSM] : no HSM [Non-distended] : non-distended [No Masses] : no abdominal mass palpated [Normal Bowel Sounds] : normal bowel sounds [Normal Posterior Cervical Nodes] : no posterior cervical lymphadenopathy [Normal Anterior Cervical Nodes] : no anterior cervical lymphadenopathy [No Spinal Tenderness] : no spinal tenderness [No Joint Swelling] : no joint swelling [No CVA Tenderness] : no CVA  tenderness [Grossly Normal Strength/Tone] : grossly normal strength/tone [No Rash] : no rash [Coordination Grossly Intact] : coordination grossly intact [No Focal Deficits] : no focal deficits [Normal Gait] : normal gait [Normal Affect] : the affect was normal [Normal Insight/Judgement] : insight and judgment were intact

## 2020-09-16 NOTE — ASSESSMENT
[FreeTextEntry1] : Patient is doing well clinically. High dose flu vaccine has been given. Routine labs have been drawn patient is to return in 6 months.

## 2020-09-18 LAB
ALBUMIN SERPL ELPH-MCNC: 4.4 G/DL
ALP BLD-CCNC: 56 U/L
ALT SERPL-CCNC: 10 U/L
ANION GAP SERPL CALC-SCNC: 13 MMOL/L
AST SERPL-CCNC: 25 U/L
BASOPHILS # BLD AUTO: 0.06 K/UL
BASOPHILS NFR BLD AUTO: 0.8 %
BILIRUB SERPL-MCNC: 0.2 MG/DL
BUN SERPL-MCNC: 23 MG/DL
CALCIUM SERPL-MCNC: 9.9 MG/DL
CHLORIDE SERPL-SCNC: 105 MMOL/L
CHOLEST SERPL-MCNC: 159 MG/DL
CHOLEST/HDLC SERPL: 2.8 RATIO
CO2 SERPL-SCNC: 22 MMOL/L
CREAT SERPL-MCNC: 1.25 MG/DL
EOSINOPHIL # BLD AUTO: 0.56 K/UL
EOSINOPHIL NFR BLD AUTO: 7 %
ESTIMATED AVERAGE GLUCOSE: 105 MG/DL
GLUCOSE SERPL-MCNC: 91 MG/DL
HBA1C MFR BLD HPLC: 5.3 %
HCT VFR BLD CALC: 40.8 %
HDLC SERPL-MCNC: 56 MG/DL
HGB BLD-MCNC: 12.4 G/DL
IMM GRANULOCYTES NFR BLD AUTO: 0.9 %
LDLC SERPL CALC-MCNC: 83 MG/DL
LYMPHOCYTES # BLD AUTO: 0.51 K/UL
LYMPHOCYTES NFR BLD AUTO: 6.4 %
MAN DIFF?: NORMAL
MCHC RBC-ENTMCNC: 29 PG
MCHC RBC-ENTMCNC: 30.4 GM/DL
MCV RBC AUTO: 95.6 FL
MONOCYTES # BLD AUTO: 0.71 K/UL
MONOCYTES NFR BLD AUTO: 8.9 %
NEUTROPHILS # BLD AUTO: 6.05 K/UL
NEUTROPHILS NFR BLD AUTO: 76 %
PLATELET # BLD AUTO: 337 K/UL
POTASSIUM SERPL-SCNC: 4.7 MMOL/L
PROT SERPL-MCNC: 7.2 G/DL
RBC # BLD: 4.27 M/UL
RBC # FLD: 14.1 %
SODIUM SERPL-SCNC: 140 MMOL/L
TRIGL SERPL-MCNC: 97 MG/DL
WBC # FLD AUTO: 7.96 K/UL

## 2020-09-18 NOTE — DISCUSSION/SUMMARY
[FreeTextEntry1] : Atrial fibrillation\par The working diagnosis is paroxysmal atrial fibrillation secondary to atherosclerotic -hypertensive heart disease. In 4/18  atrial fibrillation with a ventricular response 80/minute was detected in the setting of a hospitalization for altered mental status and diarrhea. Sinus rhythm was restored spontaneously . A 9/18 Holter monitor was normal. In my judgment, the risk of anticoagulation/antithrombotic therapy at this time outweigh any potential benefit.\par \par I have recommended the following:\par 1. Continue the present medical regimen\par 2. No further cardiac testing for this problem at this time\par 3. Reconsideration of anticoagulation if recurrent atrial fibrillation is documented\par \par \par \par Hyperlipidemia\par Hyperlipidemia represents a risk factor for progressive atherosclerotic disease. The target LDL level with known atherosclerotic heart disease is about 70. HMG-CoA reductase inhibitor therapy has been effective. In  9/20  the serum cholesterol level was 159 triglycerides 97  HDL 56  and LDL 83. The dose of atorvastatin may be increased if required to maintain optimal levels. Non-pharmacological therapy, specifically diet and exercise are emphasized as major aspects of treatment.\par \par I have recommended the following:\par 1 low-salt low-fat diet. Regular aerobic exercise\par 2 continue present medical regimen\par 3 target LDL level to about 70 as discussed above\par 4 increase atorvastatin dose if required to maintain optimal levels as noted above.\par \par \par \par Atherosclerotic heart disease\par Mr. Mckenzie has known atherosclerotic heart disease. 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 The RCA was 100% occluded. The LAD had a 20% stenosis. The left circumflex was patent. The left ventricle ejection fraction was 52%. Since that time has been no recurrent myocardial ischemic event.. The resting  9/20   electrocardiogram shows no evidence of myocardial ischemia or infarction. A 4/18 echocardiogram demonstrated left ventricular ejection fraction of 64%.. In view of the lack of symptoms and clinical course continued medical management is indicated.A noninvasive cardiac reevaluation will be helpful to management decisions The patient's age and multiple medical problems are major influences on management decisions.\par \par I have recommended the following:\par 1. Risk factor modification\par 2. Continue the present medical regimen\par 3. Lexiscan sestamibi study  if / when patient consents (presently declines)\par \par \par Hypertension\par Hypertension has been controlled on the present medical regimen. In the setting of atherosclerotic heart disease previous myocardial infarction and atrial fibrillation beta blocker and Ace-I./ARB therapy are attractive.\par \par I have recommended the following:\par 1. Continue the present medical regimen\par 2. Home blood pressure monitoring\par 3. Increased metoprolol dose and/or addition of ACE-I/ARB therapy if required to maintain optimum levels as discussed above\par \par \par Valvular heart disease\par The 4/18 echocardiogram revealed mild mitral regurgitation with calcification the mitral valve annulus. Aortic valve sclerosis was noted. The left ventricle ejection fraction was 64% . The present cardiac physical examination is not suggestive of severe mitral regurgitation. In view of the lack of symptoms, absence of heart failure and clinical course continued monitoring without intervention is indicated at this time .\par \par I have recommended the following :\par 1. no further cardiac testing for this problem at this time.\par \par \par \par The  diagnosis, prognosis, risks, options and alternatives were explained at length to the patient and family. All questions were answered. Issues discussed included atherosclerotic heart disease hypertension hyperlipidemia noninvasive cardiac testing atrial fibrillation and anticoagulation.\par \par Counseling and/or coordination of care\par Time was a significant factor for this patient encounter. Total time spent with the patient was 25 minutes. 50% of the time was devoted to counseling and/or coordination of care.

## 2020-09-18 NOTE — PHYSICAL EXAM
[Normal Conjunctiva] : the conjunctiva exhibited no abnormalities [Auscultation Breath Sounds / Voice Sounds] : lungs were clear to auscultation bilaterally [Heart Rate And Rhythm] : heart rate and rhythm were normal [Murmurs] : no murmurs present [Heart Sounds] : normal S1 and S2 [Bowel Sounds] : normal bowel sounds [Edema] : no peripheral edema present [Abnormal Walk] : normal gait [Abdomen Soft] : soft [Abdomen Tenderness] : non-tender [] : no rash [Cyanosis, Localized] : no localized cyanosis [Affect] : the affect was normal [FreeTextEntry1] : pleasant

## 2020-09-18 NOTE — HISTORY OF PRESENT ILLNESS
[FreeTextEntry1] : 89-year-old man\par Routine followup\par "I feel good."  Mr. Mckenzie denies chest pain, palpitations, shortness of breath or syncope.\par \par Dawit  is accompanied today by his son

## 2020-12-16 PROBLEM — J06.9 UPPER RESPIRATORY INFECTION, ACUTE: Status: RESOLVED | Noted: 2018-10-31 | Resolved: 2020-12-16

## 2021-01-01 ENCOUNTER — APPOINTMENT (OUTPATIENT)
Dept: CARDIOLOGY | Facility: CLINIC | Age: 86
End: 2021-01-01
Payer: MEDICARE

## 2021-01-01 ENCOUNTER — APPOINTMENT (OUTPATIENT)
Dept: INTERNAL MEDICINE | Facility: CLINIC | Age: 86
End: 2021-01-01
Payer: MEDICARE

## 2021-01-01 ENCOUNTER — RX RENEWAL (OUTPATIENT)
Age: 86
End: 2021-01-01

## 2021-01-01 ENCOUNTER — NON-APPOINTMENT (OUTPATIENT)
Age: 86
End: 2021-01-01

## 2021-01-01 VITALS
SYSTOLIC BLOOD PRESSURE: 140 MMHG | HEART RATE: 72 BPM | DIASTOLIC BLOOD PRESSURE: 80 MMHG | BODY MASS INDEX: 20.31 KG/M2 | OXYGEN SATURATION: 99 % | HEIGHT: 68 IN | WEIGHT: 134 LBS | TEMPERATURE: 96.7 F

## 2021-01-01 VITALS
WEIGHT: 135 LBS | BODY MASS INDEX: 20.53 KG/M2 | SYSTOLIC BLOOD PRESSURE: 178 MMHG | OXYGEN SATURATION: 97 % | HEART RATE: 78 BPM | DIASTOLIC BLOOD PRESSURE: 80 MMHG

## 2021-01-01 DIAGNOSIS — K62.5 HEMORRHAGE OF ANUS AND RECTUM: ICD-10-CM

## 2021-01-01 DIAGNOSIS — D64.9 ANEMIA, UNSPECIFIED: ICD-10-CM

## 2021-01-01 LAB
ALBUMIN SERPL ELPH-MCNC: 4.4 G/DL
ALP BLD-CCNC: 66 U/L
ALT SERPL-CCNC: 11 U/L
ANION GAP SERPL CALC-SCNC: 11 MMOL/L
AST SERPL-CCNC: 17 U/L
BASOPHILS # BLD AUTO: 0.04 K/UL
BASOPHILS NFR BLD AUTO: 0.6 %
BILIRUB SERPL-MCNC: 0.4 MG/DL
BUN SERPL-MCNC: 20 MG/DL
CALCIUM SERPL-MCNC: 9.7 MG/DL
CHLORIDE SERPL-SCNC: 104 MMOL/L
CHOLEST SERPL-MCNC: 149 MG/DL
CO2 SERPL-SCNC: 24 MMOL/L
CREAT SERPL-MCNC: 1.21 MG/DL
EOSINOPHIL # BLD AUTO: 0.62 K/UL
EOSINOPHIL NFR BLD AUTO: 9.7 %
FERRITIN SERPL-MCNC: 127 NG/ML
GLUCOSE SERPL-MCNC: 97 MG/DL
HCT VFR BLD CALC: 40.3 %
HDLC SERPL-MCNC: 54 MG/DL
HGB BLD-MCNC: 12.4 G/DL
IMM GRANULOCYTES NFR BLD AUTO: 1.1 %
IRON SATN MFR SERPL: 37 %
IRON SERPL-MCNC: 102 UG/DL
LDLC SERPL CALC-MCNC: 73 MG/DL
LYMPHOCYTES # BLD AUTO: 0.48 K/UL
LYMPHOCYTES NFR BLD AUTO: 7.5 %
MAN DIFF?: NORMAL
MCHC RBC-ENTMCNC: 29.3 PG
MCHC RBC-ENTMCNC: 30.8 GM/DL
MCV RBC AUTO: 95.3 FL
MONOCYTES # BLD AUTO: 0.6 K/UL
MONOCYTES NFR BLD AUTO: 9.3 %
NEUTROPHILS # BLD AUTO: 4.61 K/UL
NEUTROPHILS NFR BLD AUTO: 71.8 %
NONHDLC SERPL-MCNC: 95 MG/DL
PLATELET # BLD AUTO: 369 K/UL
POTASSIUM SERPL-SCNC: 4.9 MMOL/L
PROT SERPL-MCNC: 7.4 G/DL
RBC # BLD: 4.23 M/UL
RBC # FLD: 13.8 %
SODIUM SERPL-SCNC: 138 MMOL/L
TIBC SERPL-MCNC: 273 UG/DL
TRIGL SERPL-MCNC: 109 MG/DL
UIBC SERPL-MCNC: 171 UG/DL
WBC # FLD AUTO: 6.42 K/UL

## 2021-01-01 PROCEDURE — 36415 COLL VENOUS BLD VENIPUNCTURE: CPT

## 2021-01-01 PROCEDURE — 99213 OFFICE O/P EST LOW 20 MIN: CPT | Mod: 25

## 2021-01-01 PROCEDURE — 99213 OFFICE O/P EST LOW 20 MIN: CPT

## 2021-01-01 PROCEDURE — 93000 ELECTROCARDIOGRAM COMPLETE: CPT

## 2021-01-01 RX ORDER — ATORVASTATIN CALCIUM 10 MG/1
10 TABLET, FILM COATED ORAL DAILY
Qty: 90 | Refills: 3 | Status: ACTIVE | COMMUNITY
Start: 2020-09-16 | End: 1900-01-01

## 2021-01-01 RX ORDER — CLOPIDOGREL BISULFATE 75 MG/1
75 TABLET, FILM COATED ORAL DAILY
Qty: 90 | Refills: 3 | Status: ACTIVE | COMMUNITY
Start: 2020-09-16 | End: 1900-01-01

## 2021-03-16 ENCOUNTER — APPOINTMENT (OUTPATIENT)
Dept: INTERNAL MEDICINE | Facility: CLINIC | Age: 86
End: 2021-03-16
Payer: MEDICARE

## 2021-03-16 ENCOUNTER — NON-APPOINTMENT (OUTPATIENT)
Age: 86
End: 2021-03-16

## 2021-03-16 ENCOUNTER — APPOINTMENT (OUTPATIENT)
Dept: CARDIOLOGY | Facility: CLINIC | Age: 86
End: 2021-03-16
Payer: MEDICARE

## 2021-03-16 VITALS
HEIGHT: 68 IN | TEMPERATURE: 97.8 F | HEART RATE: 76 BPM | WEIGHT: 139 LBS | SYSTOLIC BLOOD PRESSURE: 140 MMHG | BODY MASS INDEX: 21.07 KG/M2 | DIASTOLIC BLOOD PRESSURE: 70 MMHG | OXYGEN SATURATION: 98 %

## 2021-03-16 VITALS
WEIGHT: 139 LBS | BODY MASS INDEX: 21.13 KG/M2 | HEART RATE: 77 BPM | TEMPERATURE: 97.1 F | SYSTOLIC BLOOD PRESSURE: 138 MMHG | OXYGEN SATURATION: 95 % | DIASTOLIC BLOOD PRESSURE: 56 MMHG

## 2021-03-16 DIAGNOSIS — Z86.73 PERSONAL HISTORY OF TRANSIENT ISCHEMIC ATTACK (TIA), AND CEREBRAL INFARCTION W/OUT RESIDUAL DEFICITS: ICD-10-CM

## 2021-03-16 PROCEDURE — 93000 ELECTROCARDIOGRAM COMPLETE: CPT

## 2021-03-16 PROCEDURE — 99213 OFFICE O/P EST LOW 20 MIN: CPT

## 2021-03-16 NOTE — ASSESSMENT
[FreeTextEntry1] : Patient is clinically quite stable. He is to continue current therapy. He will be getting labs drawn through the oncology group next month. He will be following up with cardiology today.

## 2021-03-16 NOTE — HISTORY OF PRESENT ILLNESS
[FreeTextEntry1] : Six-month followup here [de-identified] : Patient reached 89 years old. Has a history of hypertension, ASHD, prior CVA and history of rectal cancer. He is still extremely well over the past 6 months. He is essentially asymptomatic. His only problem is difficulty with the right shoulder which she's had for about 2-3 years. He does not want to address it as it doesn't bother her. He has an appointment with the oncologist in April for an MRI and blood work. Medication was reviewed Plavix 75, baby aspirin, Toprol 25, Lipitor 10 and eyedrops.

## 2021-03-18 PROBLEM — Z86.73 HISTORY OF CEREBROVASCULAR ACCIDENT: Status: RESOLVED | Noted: 2018-07-22 | Resolved: 2021-03-18

## 2021-03-18 RX ORDER — DORZOLAMIDE HYDROCHLORIDE TIMOLOL MALEATE 20; 5 MG/ML; MG/ML
22.3-6.8 SOLUTION/ DROPS OPHTHALMIC
Qty: 10 | Refills: 0 | Status: ACTIVE | COMMUNITY
Start: 2020-06-16

## 2021-03-18 NOTE — HISTORY OF PRESENT ILLNESS
[FreeTextEntry1] : 89-year-old\par Routine followup\par "I feel okay."  Dawit denies chest pain, palpitations, shortness of breath or syncope.\par No fever or cough\par \par Mr. Mckenzie is accompanied today by his son

## 2021-03-18 NOTE — DISCUSSION/SUMMARY
[FreeTextEntry1] : Atrial fibrillation\par The working diagnosis is paroxysmal atrial fibrillation secondary to atherosclerotic -hypertensive heart disease. In 4/18  atrial fibrillation with a ventricular response 80/minute was detected in the setting of a hospitalization for altered mental status and diarrhea. Sinus rhythm was restored spontaneously . A 9/18 Holter monitor was normal. In my judgment, the risk of anticoagulation/antithrombotic therapy at this time outweigh any potential benefit.\par \par I have recommended the following:\par 1. Continue the present medical regimen\par 2. No further cardiac testing for this problem at this time\par 3. Reconsideration of anticoagulation if recurrent atrial fibrillation is documented\par \par \par \par Hyperlipidemia\par Hyperlipidemia represents a risk factor for progressive atherosclerotic disease. The target LDL level with known atherosclerotic heart disease is about 70. HMG-CoA reductase inhibitor therapy has been effective. In  9/20  the serum cholesterol level was 159 triglycerides 97  HDL 56  and LDL 83. The dose of atorvastatin may be increased if required to maintain optimal levels. Non-pharmacological therapy, specifically diet and exercise are emphasized as major aspects of treatment.\par \par I have recommended the following:\par 1 low-salt low-fat diet. Regular aerobic exercise\par 2 continue present medical regimen\par 3 target LDL level to about 70 as discussed above\par 4 increase atorvastatin dose if required to maintain optimal levels as noted above.\par \par \par \par Atherosclerotic heart disease\par Mr. Mckenzie has known atherosclerotic heart disease. 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 The RCA was 100% occluded. The LAD had a 20% stenosis. The left circumflex was patent. The left ventricle ejection fraction was 52%. Since that time has been no recurrent myocardial ischemic event.. The resting  3/21   electrocardiogram shows no evidence of myocardial ischemia or infarction. A 4/18 echocardiogram demonstrated left ventricular ejection fraction of 64%.. In view of the lack of symptoms and clinical course continued medical management is indicated. \par Mr. Mckenzie  has declined to undergo a Lexiscan sestamibi study\par The patient's age and multiple medical problems are major influences on management decisions.\par \par I have recommended the following:\par 1. Risk factor modification\par 2. Continue the present medical regimen\par 3. Lexiscan sestamibi study  if / when patient consents (presently declines)\par \par \par \par \par Hypertension\par Hypertension has been controlled on the present medical regimen. In the setting of atherosclerotic heart disease previous myocardial infarction and atrial fibrillation beta blocker and Ace-I./ARB therapy are attractive.\par \par I have recommended the following:\par 1. Continue the present medical regimen\par 2. Home blood pressure monitoring\par 3. Increase metoprolol dose and/or addition of ACE-I/ARB therapy if required to maintain optimum levels as discussed above\par \par \par Valvular heart disease\par The 4/18 echocardiogram revealed mild mitral regurgitation with calcification the mitral valve annulus. Aortic valve sclerosis was noted. The left ventricle ejection fraction was 64% . The present cardiac physical examination is not suggestive of severe mitral regurgitation. In view of the lack of symptoms, absence of heart failure and clinical course continued monitoring without intervention is indicated at this time .\par \par I have recommended the following :\par 1. no further cardiac testing for this problem at this time.\par \par \par \par The  diagnosis, prognosis, risks, options and alternatives were explained at length to the patient and family. All questions were answered. Issues discussed included atherosclerotic heart disease hypertension hyperlipidemia noninvasive cardiac testing atrial fibrillation and anticoagulation.\par \par Counseling and/or coordination of care\par Time was a significant factor for this patient encounter. Total time spent with the patient was 25 minutes. 50% of the time was devoted to counseling and/or coordination of care.

## 2021-04-05 ENCOUNTER — APPOINTMENT (OUTPATIENT)
Dept: GASTROENTEROLOGY | Facility: HOSPITAL | Age: 86
End: 2021-04-05

## 2021-04-12 ENCOUNTER — APPOINTMENT (OUTPATIENT)
Dept: INTERNAL MEDICINE | Facility: CLINIC | Age: 86
End: 2021-04-12
Payer: MEDICARE

## 2021-04-12 ENCOUNTER — APPOINTMENT (OUTPATIENT)
Dept: GASTROENTEROLOGY | Facility: CLINIC | Age: 86
End: 2021-04-12
Payer: MEDICARE

## 2021-04-12 ENCOUNTER — LABORATORY RESULT (OUTPATIENT)
Age: 86
End: 2021-04-12

## 2021-04-12 VITALS
OXYGEN SATURATION: 96 % | SYSTOLIC BLOOD PRESSURE: 160 MMHG | TEMPERATURE: 97.7 F | DIASTOLIC BLOOD PRESSURE: 80 MMHG | HEIGHT: 68 IN | HEART RATE: 81 BPM | BODY MASS INDEX: 21.07 KG/M2 | WEIGHT: 139 LBS

## 2021-04-12 VITALS
WEIGHT: 139 LBS | HEART RATE: 66 BPM | SYSTOLIC BLOOD PRESSURE: 152 MMHG | TEMPERATURE: 95.9 F | DIASTOLIC BLOOD PRESSURE: 86 MMHG | RESPIRATION RATE: 18 BRPM | OXYGEN SATURATION: 98 % | BODY MASS INDEX: 21.07 KG/M2 | HEIGHT: 68 IN

## 2021-04-12 PROCEDURE — 99214 OFFICE O/P EST MOD 30 MIN: CPT

## 2021-04-12 PROCEDURE — 99213 OFFICE O/P EST LOW 20 MIN: CPT | Mod: 25

## 2021-04-12 PROCEDURE — 36415 COLL VENOUS BLD VENIPUNCTURE: CPT

## 2021-04-12 PROCEDURE — 99204 OFFICE O/P NEW MOD 45 MIN: CPT

## 2021-04-12 RX ORDER — AMLODIPINE BESYLATE 5 MG/1
5 TABLET ORAL DAILY
Refills: 0 | Status: DISCONTINUED | COMMUNITY
End: 2021-04-12

## 2021-04-12 NOTE — ASSESSMENT
[FreeTextEntry1] : Continue aspirin and plavix.\par \par Continue metamucil 1 dose daily, reviewed in detail with patient and accompanying family friend.\par \par Will see Dr. Hernández today, held off on a blood draw as  Magaly thought he may have blood drawn with Dr. Hernández.  If a blood draw is not performed, can arrange for a CBC through our office.

## 2021-04-12 NOTE — ASSESSMENT
[FreeTextEntry1] : Patient with lower GI bleed likely due to diverticulosis. Patient is advised to have a CBC checked. He is to continue with iron one pill a day. We'll discuss blood results tomorrow.

## 2021-04-12 NOTE — HISTORY OF PRESENT ILLNESS
[FreeTextEntry1] : Followup recent hospitalization. [de-identified] : Patient hospitalized one week ago with a lower GI bleed. He had colonoscopy by Dr. Kumari which did not reveal an obvious source of blood loss. It was felt this was likely due to a diverticular bleed. Patient is feeling well now with normal bowel movements. He is on iron one pill a day. He will also was taken off Toprol because of low blood pressure. Current blood pressure 150/80.

## 2021-04-13 LAB
ALBUMIN SERPL ELPH-MCNC: 4.1 G/DL
ALP BLD-CCNC: 61 U/L
ALT SERPL-CCNC: 12 U/L
ANION GAP SERPL CALC-SCNC: 10 MMOL/L
AST SERPL-CCNC: 17 U/L
BASOPHILS # BLD AUTO: 0 K/UL
BASOPHILS NFR BLD AUTO: 0 %
BILIRUB SERPL-MCNC: 0.3 MG/DL
BUN SERPL-MCNC: 13 MG/DL
CALCIUM SERPL-MCNC: 9.5 MG/DL
CHLORIDE SERPL-SCNC: 100 MMOL/L
CHOLEST SERPL-MCNC: 154 MG/DL
CO2 SERPL-SCNC: 24 MMOL/L
CREAT SERPL-MCNC: 1.2 MG/DL
EOSINOPHIL # BLD AUTO: 0.21 K/UL
EOSINOPHIL NFR BLD AUTO: 2.6 %
GLUCOSE SERPL-MCNC: 94 MG/DL
HCT VFR BLD CALC: 34 %
HDLC SERPL-MCNC: 54 MG/DL
HGB BLD-MCNC: 10.2 G/DL
LDLC SERPL CALC-MCNC: 83 MG/DL
LYMPHOCYTES # BLD AUTO: 0.55 K/UL
LYMPHOCYTES NFR BLD AUTO: 6.9 %
MAN DIFF?: NORMAL
MCHC RBC-ENTMCNC: 29.7 PG
MCHC RBC-ENTMCNC: 30 GM/DL
MCV RBC AUTO: 99.1 FL
MONOCYTES # BLD AUTO: 0.56 K/UL
MONOCYTES NFR BLD AUTO: 7 %
NEUTROPHILS # BLD AUTO: 6.56 K/UL
NEUTROPHILS NFR BLD AUTO: 82.6 %
NONHDLC SERPL-MCNC: 100 MG/DL
PLATELET # BLD AUTO: 408 K/UL
POTASSIUM SERPL-SCNC: 4.9 MMOL/L
PROT SERPL-MCNC: 7 G/DL
RBC # BLD: 3.43 M/UL
RBC # FLD: 14.7 %
SODIUM SERPL-SCNC: 135 MMOL/L
TRIGL SERPL-MCNC: 87 MG/DL
WBC # FLD AUTO: 7.94 K/UL

## 2021-06-02 ENCOUNTER — APPOINTMENT (OUTPATIENT)
Dept: INTERNAL MEDICINE | Facility: CLINIC | Age: 86
End: 2021-06-02
Payer: MEDICARE

## 2021-06-02 VITALS
WEIGHT: 139 LBS | SYSTOLIC BLOOD PRESSURE: 140 MMHG | TEMPERATURE: 96.8 F | DIASTOLIC BLOOD PRESSURE: 70 MMHG | HEIGHT: 68 IN | BODY MASS INDEX: 21.07 KG/M2

## 2021-06-02 DIAGNOSIS — Z91.81 HISTORY OF FALLING: ICD-10-CM

## 2021-06-02 PROCEDURE — 99213 OFFICE O/P EST LOW 20 MIN: CPT

## 2021-06-02 NOTE — PHYSICAL EXAM
[No Acute Distress] : no acute distress [Well Nourished] : well nourished [Well Developed] : well developed [Well-Appearing] : well-appearing [Normal Sclera/Conjunctiva] : normal sclera/conjunctiva [PERRL] : pupils equal round and reactive to light [EOMI] : extraocular movements intact [Normal Outer Ear/Nose] : the outer ears and nose were normal in appearance [Normal Oropharynx] : the oropharynx was normal [No JVD] : no jugular venous distention [No Lymphadenopathy] : no lymphadenopathy [Supple] : supple [Thyroid Normal, No Nodules] : the thyroid was normal and there were no nodules present [No Respiratory Distress] : no respiratory distress  [No Accessory Muscle Use] : no accessory muscle use [Clear to Auscultation] : lungs were clear to auscultation bilaterally [Normal Rate] : normal rate  [Regular Rhythm] : with a regular rhythm [Normal S1, S2] : normal S1 and S2 [No Murmur] : no murmur heard [No Carotid Bruits] : no carotid bruits [No Abdominal Bruit] : a ~M bruit was not heard ~T in the abdomen [No Varicosities] : no varicosities [Pedal Pulses Present] : the pedal pulses are present [No Edema] : there was no peripheral edema [No Palpable Aorta] : no palpable aorta [No Extremity Clubbing/Cyanosis] : no extremity clubbing/cyanosis [Soft] : abdomen soft [Non Tender] : non-tender [Non-distended] : non-distended [No Masses] : no abdominal mass palpated [No HSM] : no HSM [Normal Bowel Sounds] : normal bowel sounds [Normal Posterior Cervical Nodes] : no posterior cervical lymphadenopathy [Normal Anterior Cervical Nodes] : no anterior cervical lymphadenopathy [No CVA Tenderness] : no CVA  tenderness [No Spinal Tenderness] : no spinal tenderness [No Joint Swelling] : no joint swelling [Grossly Normal Strength/Tone] : grossly normal strength/tone [No Rash] : no rash [Coordination Grossly Intact] : coordination grossly intact [No Focal Deficits] : no focal deficits [Normal Gait] : normal gait [Deep Tendon Reflexes (DTR)] : deep tendon reflexes were 2+ and symmetric [Normal Affect] : the affect was normal [Normal Insight/Judgement] : insight and judgment were intact [de-identified] : minor skin tear l elbow

## 2021-06-02 NOTE — HISTORY OF PRESENT ILLNESS
[FreeTextEntry1] : Patient is seen after a fall yesterday. [de-identified] : Patient got out of bed yesterday morning quickly turned quickly and lost his balance and fell He suffered a minor laceration to the left elbow. There was no head trauma. He did not feel dizzy. He has not fallen in the past. He feels fine now. His family simply put a Band-Aid on the left elbow skin tear. He feels fine at this time.

## 2021-06-02 NOTE — ASSESSMENT
[FreeTextEntry1] : Patient with a mechanical fall yesterday morning. He has some minor skin tear of the left elbow. There is no head trauma. Patient is reminded to get up slowly when he gets out of bed. No treatment is given blood pressure 120/70 pulse 72 no orthostatic changes in

## 2021-06-15 ENCOUNTER — RX RENEWAL (OUTPATIENT)
Age: 86
End: 2021-06-15

## 2021-07-13 ENCOUNTER — APPOINTMENT (OUTPATIENT)
Dept: INTERNAL MEDICINE | Facility: CLINIC | Age: 86
End: 2021-07-13
Payer: MEDICARE

## 2021-07-13 VITALS
BODY MASS INDEX: 20.31 KG/M2 | HEIGHT: 68 IN | HEART RATE: 74 BPM | WEIGHT: 134 LBS | DIASTOLIC BLOOD PRESSURE: 80 MMHG | SYSTOLIC BLOOD PRESSURE: 130 MMHG | OXYGEN SATURATION: 98 %

## 2021-07-13 PROCEDURE — 36415 COLL VENOUS BLD VENIPUNCTURE: CPT

## 2021-07-13 PROCEDURE — 99213 OFFICE O/P EST LOW 20 MIN: CPT | Mod: 25

## 2021-07-13 NOTE — HISTORY OF PRESENT ILLNESS
[FreeTextEntry1] : Followup anemia. [de-identified] : Patient feels well without complaints of chest pain, shortness of breath or palpitations. He has had no further GI bleeding. He was seen by [facility] in June. He is scheduled for a CAT scan in about 6 months. He remains on the iron one pill daily. His bowels are normal.

## 2021-07-13 NOTE — ASSESSMENT
[FreeTextEntry1] : Patient clinically very stable. Medications were reviewed. Current blood pressure 135/70. We'll check his CBC.

## 2021-07-13 NOTE — PHYSICAL EXAM
[No Acute Distress] : no acute distress [Well Nourished] : well nourished [Well Developed] : well developed [Well-Appearing] : well-appearing [Normal Sclera/Conjunctiva] : normal sclera/conjunctiva [PERRL] : pupils equal round and reactive to light [EOMI] : extraocular movements intact [Normal Outer Ear/Nose] : the outer ears and nose were normal in appearance [Normal Oropharynx] : the oropharynx was normal [No JVD] : no jugular venous distention [No Lymphadenopathy] : no lymphadenopathy [Supple] : supple [Thyroid Normal, No Nodules] : the thyroid was normal and there were no nodules present [No Respiratory Distress] : no respiratory distress  [No Accessory Muscle Use] : no accessory muscle use [Clear to Auscultation] : lungs were clear to auscultation bilaterally [Normal Rate] : normal rate  [Regular Rhythm] : with a regular rhythm [Normal S1, S2] : normal S1 and S2 [No Carotid Bruits] : no carotid bruits [No Abdominal Bruit] : a ~M bruit was not heard ~T in the abdomen [No Varicosities] : no varicosities [Pedal Pulses Present] : the pedal pulses are present [No Edema] : there was no peripheral edema [No Palpable Aorta] : no palpable aorta [No Extremity Clubbing/Cyanosis] : no extremity clubbing/cyanosis [Soft] : abdomen soft [Non Tender] : non-tender [Non-distended] : non-distended [No Masses] : no abdominal mass palpated [No HSM] : no HSM [Normal Bowel Sounds] : normal bowel sounds [Normal Posterior Cervical Nodes] : no posterior cervical lymphadenopathy [Normal Anterior Cervical Nodes] : no anterior cervical lymphadenopathy [No CVA Tenderness] : no CVA  tenderness [No Spinal Tenderness] : no spinal tenderness [No Joint Swelling] : no joint swelling [Grossly Normal Strength/Tone] : grossly normal strength/tone [No Rash] : no rash [Coordination Grossly Intact] : coordination grossly intact [No Focal Deficits] : no focal deficits [Normal Gait] : normal gait [Deep Tendon Reflexes (DTR)] : deep tendon reflexes were 2+ and symmetric [Normal Affect] : the affect was normal [Normal Insight/Judgement] : insight and judgment were intact [de-identified] : 2/6 hsm at [facility] and apex

## 2021-07-14 LAB
BASOPHILS # BLD AUTO: 0.08 K/UL
BASOPHILS NFR BLD AUTO: 1.1 %
EOSINOPHIL # BLD AUTO: 0.78 K/UL
EOSINOPHIL NFR BLD AUTO: 10.9 %
HCT VFR BLD CALC: 39.4 %
HGB BLD-MCNC: 12.3 G/DL
IMM GRANULOCYTES NFR BLD AUTO: 1.1 %
LYMPHOCYTES # BLD AUTO: 0.56 K/UL
LYMPHOCYTES NFR BLD AUTO: 7.8 %
MAN DIFF?: NORMAL
MCHC RBC-ENTMCNC: 29.6 PG
MCHC RBC-ENTMCNC: 31.2 GM/DL
MCV RBC AUTO: 94.9 FL
MONOCYTES # BLD AUTO: 0.62 K/UL
MONOCYTES NFR BLD AUTO: 8.7 %
NEUTROPHILS # BLD AUTO: 5.03 K/UL
NEUTROPHILS NFR BLD AUTO: 70.4 %
PLATELET # BLD AUTO: 356 K/UL
RBC # BLD: 4.15 M/UL
RBC # FLD: 14.1 %
WBC # FLD AUTO: 7.15 K/UL

## 2021-09-13 PROBLEM — K62.5 RECTAL BLEED: Status: ACTIVE | Noted: 2019-01-02

## 2021-09-13 PROBLEM — D64.9 ANEMIA: Status: ACTIVE | Noted: 2018-07-03

## 2021-09-13 NOTE — HISTORY OF PRESENT ILLNESS
[FreeTextEntry1] :  followup of anemia. [de-identified] : Patient is doing quite well. He has had no further rectal bleeding. No chest pain, shortness of breath or palpitations. He denies chronic complaints. He has received his flu vaccine. Medications were reviewed. He is scheduled to see Dr. farrell this afternoon. He is off iron pills. Current blood pressure 130/70.

## 2021-09-13 NOTE — ASSESSMENT
[FreeTextEntry1] : Patient with history of rectal cancer. He is followed at [facility]. He will be going for an MRI in October. We'll draw routine labs including iron and ferritin level. Patient is currently off iron supplement.

## 2021-09-14 NOTE — PHYSICAL EXAM
[Normal Conjunctiva] : the conjunctiva exhibited no abnormalities [Heart Rate And Rhythm] : heart rate and rhythm were normal [Auscultation Breath Sounds / Voice Sounds] : lungs were clear to auscultation bilaterally [Heart Sounds] : normal S1 and S2 [Murmurs] : no murmurs present [Edema] : no peripheral edema present [Bowel Sounds] : normal bowel sounds [Abdomen Soft] : soft [Abdomen Tenderness] : non-tender [Abnormal Walk] : normal gait [Cyanosis, Localized] : no localized cyanosis [] : no rash [Affect] : the affect was normal [FreeTextEntry1] : pleasant

## 2021-09-14 NOTE — HISTORY OF PRESENT ILLNESS
[FreeTextEntry1] : 89-year-old male\par Routine followup\par \par "I feel good"  Dawit denies chest pain, palpitations, shortness of breath or syncope.\par Mr. Mckenzie is accompanied today by his son

## 2021-09-14 NOTE — DISCUSSION/SUMMARY
[FreeTextEntry1] : Atrial fibrillation\par The working diagnosis is paroxysmal atrial fibrillation secondary to atherosclerotic -hypertensive heart disease. In 4/18  atrial fibrillation with a ventricular response 80/minute was detected in the setting of a hospitalization for altered mental status and diarrhea. Sinus rhythm was restored spontaneously . A 9/18 Holter monitor was normal. In my judgment, the risk of anticoagulation/antithrombotic therapy at this time outweigh any potential benefit.\par \par I have recommended the following:\par 1. Continue the present medical regimen\par 2. No further cardiac testing for this problem at this time\par 3. Reconsideration of anticoagulation if recurrent atrial fibrillation is documented\par \par \par \par Hyperlipidemia\par Hyperlipidemia represents a risk factor for progressive atherosclerotic disease. The target LDL level with known atherosclerotic heart disease is about 70. HMG-CoA reductase inhibitor therapy has been effective. In  9/21   the serum cholesterol level was 149 triglycerides 109  HDL 54   and LDL 73 . The dose of atorvastatin may be increased if required to maintain optimal levels. Non-pharmacological therapy, specifically diet and exercise are emphasized as major aspects of treatment.\par \par I have recommended the following:\par 1 low-salt low-fat diet. Regular aerobic exercise\par 2 continue present medical regimen\par 3 target LDL level to about 70 as discussed above\par 4 increase atorvastatin dose if required to maintain optimal levels as noted above.\par \par \par \par \par \par Atherosclerotic heart disease\par Mr. Mckenzie has known atherosclerotic heart disease. In 12/87 Mr. Mckeznie had an inferior wall myocardial  infarction. The RCA was 100% occluded. The LAD had a 20% stenosis. The left circumflex was patent. The left ventricle ejection fraction was 52%. Since that time has been no recurrent myocardial ischemic event.. The resting  9/21    electrocardiogram shows no evidence of myocardial ischemia or infarction. A 4/18 echocardiogram demonstrated left ventricular ejection fraction of 64%.. In view of the lack of symptoms and clinical course continued medical management is indicated. \par Mr. Mckenzie  has declined to undergo a Lexiscan sestamibi study\par The patient's age and multiple medical problems are major influences on management decisions.\par \par \par I have recommended the following:\par 1. Risk factor modification\par 2. Continue the present medical regimen\par 3. Lexiscan sestamibi study  if / when patient consents (presently declines)\par \par \par \par \par Hypertension\par Hypertension has been reportedly controlled on the present medical regimen.  Elevation of blood pressure on initial examination today  (178/80 mmHg) is attributed to a white coat effect. In the setting of atherosclerotic heart disease previous myocardial infarction and atrial fibrillation beta blocker and Ace-I./ARB therapy are attractive.   Followup blood pressure checks will be helpful to determine requirements for additional antihypertensive medical therapy\par \par I have recommended the following:\par 1. Continue the present medical regimen\par 2. Home blood pressure monitoring\par 3. Increase metoprolol dose and/or addition of ACE-I/ARB therapy if required to maintain optimum levels as discussed above\par \par \par Valvular heart disease\par The 4/18 echocardiogram revealed mild mitral regurgitation with calcification the mitral valve annulus. Aortic valve sclerosis was noted. The left ventricle ejection fraction was 64% . The present cardiac physical examination is not suggestive of severe mitral regurgitation. In view of the lack of symptoms, absence of heart failure and clinical course continued monitoring without intervention is indicated at this time .\par \par I have recommended the following :\par 1. no further cardiac testing for this problem at this time.\par \par \par \par The  diagnosis, prognosis, risks, options and alternatives were explained at length to the patient and family. All questions were answered. Issues discussed included atherosclerotic heart disease hypertension hyperlipidemia noninvasive cardiac testing atrial fibrillation and anticoagulation.\par \par Counseling and/or coordination of care\par Time was a significant factor for this patient encounter. Total time spent with the patient was 25 minutes. 50% of the time was devoted to counseling and/or coordination of care.

## 2021-09-22 NOTE — HISTORY OF PRESENT ILLNESS
[FreeTextEntry1] : Mr. Mckenzie is a 89M h/o rectal cancer s/p chemo/XRT (diagnosed 6/17), CVA, paroxysmal a. fib, HTN, HLD, CAD s/p MI (EF 64%), mitral regurgitation, hip Fx, diverticulosis, prostate cancer s/p brachytherapy who was admitted at [facility] from 4/3 - 4/6/21 with a GI bleed.\par \par Initially had BRBPR.  Underwent a colonoscopy on 4/5 with myself showing diverticulosis, internal hemorrhoids, brown stool, no active GI bleeding or lesions.\par \par His bleed was thought due to diverticulosis more likely than hemorrhoids in the setting of aspirin and plavix.\par \par He has since restarted both medications, no further bleeding.\par \par Labs day of discharge included H/H 9.0/28.6.  No repeat labs since discharge.\par \par He has since started metamucil daily.\par \par Feels back to baseline, although did have loose stools for several days after his hospitalization, now normalizing.\par \melvina Follows at [facility] – [place], [place] for a h/o rectal cancer.
Home

## 2022-01-01 ENCOUNTER — RESULT REVIEW (OUTPATIENT)
Age: 87
End: 2022-01-01

## 2022-01-01 ENCOUNTER — TRANSCRIPTION ENCOUNTER (OUTPATIENT)
Age: 87
End: 2022-01-01

## 2022-01-01 ENCOUNTER — RX RENEWAL (OUTPATIENT)
Age: 87
End: 2022-01-01

## 2022-01-01 ENCOUNTER — APPOINTMENT (OUTPATIENT)
Dept: CARDIOLOGY | Facility: CLINIC | Age: 87
End: 2022-01-01
Payer: MEDICARE

## 2022-01-01 ENCOUNTER — APPOINTMENT (OUTPATIENT)
Dept: INTERNAL MEDICINE | Facility: CLINIC | Age: 87
End: 2022-01-01
Payer: MEDICARE

## 2022-01-01 ENCOUNTER — APPOINTMENT (OUTPATIENT)
Dept: INTERNAL MEDICINE | Facility: CLINIC | Age: 87
End: 2022-01-01

## 2022-01-01 VITALS
HEART RATE: 56 BPM | SYSTOLIC BLOOD PRESSURE: 140 MMHG | DIASTOLIC BLOOD PRESSURE: 80 MMHG | BODY MASS INDEX: 18.19 KG/M2 | OXYGEN SATURATION: 98 % | WEIGHT: 120 LBS | HEIGHT: 68 IN | TEMPERATURE: 96.7 F

## 2022-01-01 VITALS
BODY MASS INDEX: 18.64 KG/M2 | DIASTOLIC BLOOD PRESSURE: 80 MMHG | OXYGEN SATURATION: 98 % | HEIGHT: 68 IN | WEIGHT: 123 LBS | HEART RATE: 74 BPM | SYSTOLIC BLOOD PRESSURE: 140 MMHG

## 2022-01-01 VITALS
HEIGHT: 68 IN | SYSTOLIC BLOOD PRESSURE: 130 MMHG | HEART RATE: 79 BPM | WEIGHT: 130 LBS | DIASTOLIC BLOOD PRESSURE: 70 MMHG | BODY MASS INDEX: 19.7 KG/M2 | TEMPERATURE: 96.8 F | OXYGEN SATURATION: 97 %

## 2022-01-01 VITALS
SYSTOLIC BLOOD PRESSURE: 169 MMHG | HEIGHT: 68 IN | DIASTOLIC BLOOD PRESSURE: 82 MMHG | BODY MASS INDEX: 19.7 KG/M2 | WEIGHT: 130 LBS | HEART RATE: 75 BPM | OXYGEN SATURATION: 98 %

## 2022-01-01 DIAGNOSIS — I63.9 CEREBRAL INFARCTION, UNSPECIFIED: ICD-10-CM

## 2022-01-01 DIAGNOSIS — E78.5 HYPERLIPIDEMIA, UNSPECIFIED: ICD-10-CM

## 2022-01-01 DIAGNOSIS — R13.10 DYSPHAGIA, UNSPECIFIED: ICD-10-CM

## 2022-01-01 DIAGNOSIS — I38 ENDOCARDITIS, VALVE UNSPECIFIED: ICD-10-CM

## 2022-01-01 DIAGNOSIS — R05.3 CHRONIC COUGH: ICD-10-CM

## 2022-01-01 DIAGNOSIS — I34.0 NONRHEUMATIC MITRAL (VALVE) INSUFFICIENCY: ICD-10-CM

## 2022-01-01 DIAGNOSIS — I10 ESSENTIAL (PRIMARY) HYPERTENSION: ICD-10-CM

## 2022-01-01 DIAGNOSIS — R93.89 ABNORMAL FINDINGS ON DIAGNOSTIC IMAGING OF OTHER SPECIFIED BODY STRUCTURES: ICD-10-CM

## 2022-01-01 DIAGNOSIS — U07.1 COVID-19: ICD-10-CM

## 2022-01-01 DIAGNOSIS — I25.10 ATHEROSCLEROTIC HEART DISEASE OF NATIVE CORONARY ARTERY W/OUT ANGINA PECTORIS: ICD-10-CM

## 2022-01-01 DIAGNOSIS — I48.91 UNSPECIFIED ATRIAL FIBRILLATION: ICD-10-CM

## 2022-01-01 PROCEDURE — 93000 ELECTROCARDIOGRAM COMPLETE: CPT

## 2022-01-01 PROCEDURE — 99214 OFFICE O/P EST MOD 30 MIN: CPT

## 2022-01-01 PROCEDURE — 99213 OFFICE O/P EST LOW 20 MIN: CPT

## 2022-01-01 RX ORDER — METOPROLOL SUCCINATE 25 MG/1
25 TABLET, EXTENDED RELEASE ORAL DAILY
Qty: 90 | Refills: 3 | Status: ACTIVE | COMMUNITY
Start: 2018-06-27 | End: 1900-01-01

## 2022-01-01 RX ORDER — AMOXICILLIN AND CLAVULANATE POTASSIUM 875; 125 MG/1; MG/1
875-125 TABLET, COATED ORAL
Qty: 20 | Refills: 1 | Status: ACTIVE | COMMUNITY
Start: 2022-01-01 | End: 1900-01-01

## 2022-01-01 RX ORDER — AMOXICILLIN AND CLAVULANATE POTASSIUM 400; 57 MG/5ML; MG/5ML
400-57 POWDER, FOR SUSPENSION ORAL 3 TIMES DAILY
Qty: 3 | Refills: 1 | Status: ACTIVE | COMMUNITY
Start: 2022-01-01 | End: 1900-01-01

## 2022-03-14 PROBLEM — E78.5 HYPERLIPIDEMIA: Status: ACTIVE | Noted: 2018-06-29

## 2022-03-14 PROBLEM — I38 VALVULAR HEART DISEASE: Status: ACTIVE | Noted: 2019-04-06

## 2022-03-14 PROBLEM — I48.91 ATRIAL FIBRILLATION: Status: ACTIVE | Noted: 2018-06-27

## 2022-03-14 PROBLEM — I34.0 MITRAL REGURGITATION: Status: ACTIVE | Noted: 2022-01-01

## 2022-03-14 PROBLEM — I63.9 CVA (CEREBRAL VASCULAR ACCIDENT): Status: ACTIVE | Noted: 2018-06-28

## 2022-03-14 PROBLEM — I10 HYPERTENSION: Status: ACTIVE | Noted: 2018-07-22

## 2022-03-14 NOTE — ASSESSMENT
[FreeTextEntry1] : Patient doing extremely well. Current blood pressure 135/70. Patient is advised to monitor his blood pressure at home and returns to see me in 6 months. Labs will be drawn at that time and

## 2022-03-14 NOTE — HISTORY OF PRESENT ILLNESS
[FreeTextEntry1] : Six-month followup or [de-identified] : Patient with history of ASHD, prior CVA with complete resolution and history of rectal cancer. Patient is doing extremely well. He denies chronic complaints except for right shoulder pain. He was seen by hematology and CAT scan. He denies chronic complaints. Bowels are regular. He is off iron pills. There is no weeping. He does have occasional loose bowels. There is no chest pain or shortness of breath.

## 2022-03-15 PROBLEM — I25.10 CORONARY ATHEROSCLEROSIS OF NATIVE CORONARY ARTERY: Status: ACTIVE | Noted: 2018-07-22

## 2022-03-15 NOTE — PHYSICAL EXAM
[Normal Conjunctiva] : the conjunctiva exhibited no abnormalities [Auscultation Breath Sounds / Voice Sounds] : lungs were clear to auscultation bilaterally [Heart Rate And Rhythm] : heart rate and rhythm were normal [Heart Sounds] : normal S1 and S2 [Murmurs] : no murmurs present [Edema] : no peripheral edema present [Bowel Sounds] : normal bowel sounds [Abdomen Soft] : soft [Abdomen Tenderness] : non-tender [Abnormal Walk] : normal gait [Cyanosis, Localized] : no localized cyanosis [] : no rash [Affect] : the affect was normal [FreeTextEntry1] : pleasant

## 2022-03-15 NOTE — DISCUSSION/SUMMARY
[FreeTextEntry1] : Atrial fibrillation\par The working diagnosis is paroxysmal atrial fibrillation secondary to atherosclerotic -hypertensive heart disease. In 4/18  atrial fibrillation with a ventricular response 80/minute was detected in the setting of a hospitalization for altered mental status and diarrhea. Sinus rhythm was restored spontaneously . A 9/18 Holter monitor was normal. In my judgment, the risk of anticoagulation/antithrombotic therapy at this time outweigh any potential benefit.\par \par I have recommended the following:\par 1. Continue the present medical regimen\par 2. No further cardiac testing for this problem at this time\par 3. Reconsideration of anticoagulation if recurrent atrial fibrillation is documented\par \par \par \par Hyperlipidemia\par Hyperlipidemia represents a risk factor for progressive atherosclerotic disease. The target LDL level with known atherosclerotic heart disease is about 70. HMG-CoA reductase inhibitor therapy has been effective. In  9/21   the serum cholesterol level was 149 triglycerides 109  HDL 54   and LDL 73 . The dose of atorvastatin may be increased if required to maintain optimal levels. Non-pharmacological therapy, specifically diet and exercise are emphasized as major aspects of treatment.\par \par I have recommended the following:\par 1 low-salt low-fat diet. Regular aerobic exercise\par 2 continue present medical regimen\par 3 target LDL level to about 70 as discussed above\par 4 increase atorvastatin dose if required to maintain optimal levels as noted above.\par \par \par \par \par \par Atherosclerotic heart disease\par Mr. Mckenzie has known atherosclerotic heart disease. 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 The RCA was 100% occluded. The LAD had a 20% stenosis. The left circumflex was patent. The left ventricle ejection fraction was 52%. Since that time has been no recurrent myocardial ischemic event.. The resting  3/22     electrocardiogram shows no evidence of myocardial ischemia or infarction. A 4/18 echocardiogram demonstrated left ventricular ejection fraction of 64%.. In view of the lack of symptoms and clinical course continued medical management is indicated. \par Mr. Mckenzie  has declined to undergo a Lexiscan sestamibi study\par The patient's age and multiple medical problems are major influences on management decisions.\par \par \par I have recommended the following:\par 1. Risk factor modification\par 2. Continue the present medical regimen\par 3. Lexiscan sestamibi study  if / when patient consents (presently declines)\par \par \par \par \par Hypertension\par Hypertension has been reportedly controlled on the present medical regimen.  Elevation of blood pressure on initial examination today  (169/82 mmHg) is attributed to a white coat effect. In the setting of atherosclerotic heart disease previous myocardial infarction and atrial fibrillation beta blocker and Ace-I./ARB therapy are attractive.   Followup blood pressure checks will be helpful to determine requirements for additional antihypertensive medical therapy\par \par I have recommended the following:\par 1. Continue the present medical regimen\par 2. Home blood pressure monitoring\par 3. Increase metoprolol dose and/or addition of ACE-I/ARB therapy if required to maintain optimum levels as discussed above\par \par \par Valvular heart disease\par The 4/18 echocardiogram revealed mild mitral regurgitation with calcification the mitral valve annulus. Aortic valve sclerosis was noted. The left ventricle ejection fraction was 64% . The present cardiac physical examination is not suggestive of severe mitral regurgitation. In view of the lack of symptoms, absence of heart failure and clinical course continued monitoring without intervention is indicated at this time .\par \par I have recommended the following :\par 1. no further cardiac testing for this problem at this time.\par 2. Echocardiogram with  next office evaluation in 6 months\par \par \par \par The  diagnosis, prognosis, risks, options and alternatives were explained at length to the patient and family. All questions were answered. Issues discussed included atherosclerotic heart disease hypertension hyperlipidemia noninvasive cardiac testing atrial fibrillation and anticoagulation.\par \par Counseling and/or coordination of care\par Time was a significant factor for this patient encounter. Total time spent with the patient was 25 minutes. 50% of the time was devoted to counseling and/or coordination of care.

## 2022-03-15 NOTE — HISTORY OF PRESENT ILLNESS
[FreeTextEntry1] : 89-year-old man\par Routine followup\par "I feel good"  Mr. Mckenzie denies chest pain, palpitations, shortness of breath or syncope. No ankle edema. No orthopnea.\par \par Mr. Mckenzie is accompanied today by his son

## 2022-06-13 PROBLEM — U07.1 COVID-19: Status: ACTIVE | Noted: 2022-01-01

## 2022-06-13 NOTE — ASSESSMENT
[FreeTextEntry1] : Patient is doing quite well after a covid infection 2 weeks ago. Patient and family are reassured

## 2022-06-13 NOTE — HISTORY OF PRESENT ILLNESS
[FreeTextEntry1] : Followup a recent covid infection. [de-identified] : Patient tested positive for covid approximately 2 weeks ago. He had been at that time cough and rhinorrhea. No fever, chills or myalgias. The cough has improved over the past 2 weeks and now he is on only minimal cough. He has no shortness of breath. O2 sat currently 98 on room air.

## 2022-08-17 PROBLEM — R13.10 SWALLOWING DIFFICULTY: Status: ACTIVE | Noted: 2022-01-01

## 2022-08-17 NOTE — ASSESSMENT
[FreeTextEntry1] : Patient with complaints of coughing after swallowing. Patient's voice sounds a bit hoarse. I feel this patient needs an ENT evaluation and swallow eval.

## 2022-08-17 NOTE — HISTORY OF PRESENT ILLNESS
[FreeTextEntry1] : Swallowing difficulty. [de-identified] : Patient complains the past 2 weeks of coughing with attempts to eat. He seems to cough after swallowing solids more than liquids. He coughs up whatever he is eating. He has no shortness of breath, fever or chills. He generally feels well otherwise. He never had trouble swallowing previously. His voice sounds a bit hoarse at this time.

## 2022-08-31 PROBLEM — R05.3 CHRONIC COUGH: Status: ACTIVE | Noted: 2022-01-01

## 2022-09-01 PROBLEM — R93.89 ABNORMAL CXR: Status: ACTIVE | Noted: 2022-01-01

## 2022-09-12 ENCOUNTER — APPOINTMENT (OUTPATIENT)
Dept: INTERNAL MEDICINE | Facility: CLINIC | Age: 87
End: 2022-09-12

## 2022-09-12 ENCOUNTER — APPOINTMENT (OUTPATIENT)
Dept: CARDIOLOGY | Facility: CLINIC | Age: 87
End: 2022-09-12

## 2022-09-19 ENCOUNTER — APPOINTMENT (OUTPATIENT)
Dept: CARDIOLOGY | Facility: CLINIC | Age: 87
End: 2022-09-19